# Patient Record
Sex: MALE | Race: WHITE | Employment: FULL TIME | URBAN - METROPOLITAN AREA
[De-identification: names, ages, dates, MRNs, and addresses within clinical notes are randomized per-mention and may not be internally consistent; named-entity substitution may affect disease eponyms.]

---

## 2018-08-31 ENCOUNTER — HOSPITAL ENCOUNTER (OUTPATIENT)
Dept: OTHER | Age: 58
Discharge: HOME | End: 2018-08-31
Attending: INTERNAL MEDICINE | Admitting: INTERNAL MEDICINE

## 2018-08-31 LAB
ALBUMIN SERPL-MCNC: 4.4 G/DL (ref 3.4–5)
ALP BLD-CCNC: 92 U/L (ref 40–129)
ALT SERPL-CCNC: 15 U/L (ref 10–40)
ANION GAP SERPL CALCULATED.3IONS-SCNC: 13 MMOL/L (ref 3–16)
AST SERPL-CCNC: 19 U/L (ref 15–37)
BILIRUB SERPL-MCNC: 0.5 MG/DL (ref 0–1)
BILIRUBIN DIRECT: <0.2 MG/DL (ref 0–0.3)
BILIRUBIN, INDIRECT: NORMAL MG/DL (ref 0–1)
BUN BLDV-MCNC: 15 MG/DL (ref 7–20)
CALCIUM SERPL-MCNC: 9.1 MG/DL (ref 8.3–10.6)
CHLORIDE BLD-SCNC: 105 MMOL/L (ref 99–110)
CHOLESTEROL, TOTAL: 212 MG/DL (ref 0–199)
CO2: 26 MMOL/L (ref 21–32)
CREAT SERPL-MCNC: 1 MG/DL (ref 0.9–1.3)
GFR AFRICAN AMERICAN: >60
GFR NON-AFRICAN AMERICAN: >60
GLUCOSE BLD-MCNC: 90 MG/DL (ref 70–99)
HCT VFR BLD CALC: 43.3 % (ref 40.5–52.5)
HDLC SERPL-MCNC: 59 MG/DL (ref 40–60)
HEMOGLOBIN: 15 G/DL (ref 13.5–17.5)
LDL CHOLESTEROL CALCULATED: 139 MG/DL
MCH RBC QN AUTO: 31.1 PG (ref 26–34)
MCHC RBC AUTO-ENTMCNC: 34.5 G/DL (ref 31–36)
MCV RBC AUTO: 90.1 FL (ref 80–100)
PDW BLD-RTO: 13.1 % (ref 12.4–15.4)
PLATELET # BLD: 156 K/UL (ref 135–450)
PMV BLD AUTO: 9.3 FL (ref 5–10.5)
POTASSIUM SERPL-SCNC: 4.6 MMOL/L (ref 3.5–5.1)
PROSTATE SPECIFIC ANTIGEN: 6.79 NG/ML (ref 0–4)
RBC # BLD: 4.81 M/UL (ref 4.2–5.9)
SODIUM BLD-SCNC: 144 MMOL/L (ref 136–145)
T4 TOTAL: 5.7 UG/DL (ref 4.5–10.9)
TOTAL PROTEIN: 6.8 G/DL (ref 6.4–8.2)
TRIGL SERPL-MCNC: 70 MG/DL (ref 0–150)
TSH SERPL DL<=0.05 MIU/L-ACNC: 3.28 UIU/ML (ref 0.27–4.2)
VLDLC SERPL CALC-MCNC: 14 MG/DL
WBC # BLD: 4.8 K/UL (ref 4–11)

## 2018-10-04 ENCOUNTER — HOSPITAL ENCOUNTER (OUTPATIENT)
Age: 58
Discharge: HOME | End: 2018-10-04
Payer: COMMERCIAL

## 2018-10-04 LAB
BUN BLDV-MCNC: 16 MG/DL (ref 7–20)
CREAT SERPL-MCNC: 1.1 MG/DL (ref 0.9–1.3)
GFR AFRICAN AMERICAN: >60
GFR NON-AFRICAN AMERICAN: >60
PROSTATE SPECIFIC ANTIGEN: 4.63 NG/ML (ref 0–4)

## 2018-10-04 PROCEDURE — 84153 ASSAY OF PSA TOTAL: CPT

## 2018-10-04 PROCEDURE — 82565 ASSAY OF CREATININE: CPT

## 2018-10-04 PROCEDURE — 36415 COLL VENOUS BLD VENIPUNCTURE: CPT

## 2018-10-04 PROCEDURE — 84520 ASSAY OF UREA NITROGEN: CPT

## 2019-08-27 ENCOUNTER — HOSPITAL ENCOUNTER (OUTPATIENT)
Age: 59
Discharge: HOME | End: 2019-08-27
Payer: COMMERCIAL

## 2019-08-27 LAB — PROSTATE SPECIFIC ANTIGEN: 6.86 NG/ML (ref 0–4)

## 2019-08-27 PROCEDURE — 36415 COLL VENOUS BLD VENIPUNCTURE: CPT

## 2019-08-27 PROCEDURE — 84153 ASSAY OF PSA TOTAL: CPT

## 2019-11-01 ENCOUNTER — HOSPITAL ENCOUNTER (OUTPATIENT)
Age: 59
Discharge: HOME | End: 2019-11-01
Payer: COMMERCIAL

## 2019-11-01 LAB
BUN BLDV-MCNC: 17 MG/DL (ref 7–20)
CREAT SERPL-MCNC: 1.1 MG/DL (ref 0.8–1.3)
GFR AFRICAN AMERICAN: >60
GFR NON-AFRICAN AMERICAN: >60

## 2019-11-01 PROCEDURE — 84520 ASSAY OF UREA NITROGEN: CPT

## 2019-11-01 PROCEDURE — 36415 COLL VENOUS BLD VENIPUNCTURE: CPT

## 2019-11-01 PROCEDURE — 82565 ASSAY OF CREATININE: CPT

## 2019-11-20 ENCOUNTER — OFFICE VISIT (OUTPATIENT)
Dept: CARDIOLOGY CLINIC | Age: 59
End: 2019-11-20
Payer: COMMERCIAL

## 2019-11-20 VITALS
SYSTOLIC BLOOD PRESSURE: 129 MMHG | DIASTOLIC BLOOD PRESSURE: 77 MMHG | BODY MASS INDEX: 26.51 KG/M2 | OXYGEN SATURATION: 98 % | WEIGHT: 185.2 LBS | HEART RATE: 75 BPM | HEIGHT: 70 IN

## 2019-11-20 DIAGNOSIS — R53.83 FATIGUE, UNSPECIFIED TYPE: ICD-10-CM

## 2019-11-20 DIAGNOSIS — R40.0 DAYTIME SLEEPINESS: ICD-10-CM

## 2019-11-20 DIAGNOSIS — E78.2 MIXED HYPERLIPIDEMIA: ICD-10-CM

## 2019-11-20 DIAGNOSIS — I34.0 CARDIAC MURMUR DUE TO MITRAL VALVE DISORDER: ICD-10-CM

## 2019-11-20 DIAGNOSIS — R07.89 OTHER CHEST PAIN: ICD-10-CM

## 2019-11-20 DIAGNOSIS — I34.0 MITRAL VALVE INSUFFICIENCY, UNSPECIFIED ETIOLOGY: Primary | ICD-10-CM

## 2019-11-20 DIAGNOSIS — R00.2 PALPITATIONS: ICD-10-CM

## 2019-11-20 PROCEDURE — 93000 ELECTROCARDIOGRAM COMPLETE: CPT | Performed by: INTERNAL MEDICINE

## 2019-11-20 PROCEDURE — 99204 OFFICE O/P NEW MOD 45 MIN: CPT | Performed by: INTERNAL MEDICINE

## 2019-11-20 RX ORDER — CETIRIZINE HYDROCHLORIDE 10 MG/1
10 TABLET ORAL
COMMUNITY

## 2019-12-12 ENCOUNTER — HOSPITAL ENCOUNTER (OUTPATIENT)
Dept: NON INVASIVE DIAGNOSTICS | Age: 59
Discharge: HOME | End: 2019-12-12
Payer: COMMERCIAL

## 2019-12-12 ENCOUNTER — HOSPITAL ENCOUNTER (OUTPATIENT)
Age: 59
Discharge: HOME | End: 2019-12-12
Payer: COMMERCIAL

## 2019-12-12 DIAGNOSIS — R53.83 FATIGUE, UNSPECIFIED TYPE: ICD-10-CM

## 2019-12-12 DIAGNOSIS — I34.0 CARDIAC MURMUR DUE TO MITRAL VALVE DISORDER: ICD-10-CM

## 2019-12-12 DIAGNOSIS — I34.0 MITRAL VALVE INSUFFICIENCY, UNSPECIFIED ETIOLOGY: ICD-10-CM

## 2019-12-12 DIAGNOSIS — R07.89 OTHER CHEST PAIN: ICD-10-CM

## 2019-12-12 DIAGNOSIS — R00.2 PALPITATIONS: ICD-10-CM

## 2019-12-12 DIAGNOSIS — E78.2 MIXED HYPERLIPIDEMIA: ICD-10-CM

## 2019-12-12 LAB
ANION GAP SERPL CALCULATED.3IONS-SCNC: 11 MMOL/L (ref 3–16)
BUN BLDV-MCNC: 16 MG/DL (ref 7–20)
CALCIUM SERPL-MCNC: 9.6 MG/DL (ref 8.3–10.6)
CHLORIDE BLD-SCNC: 103 MMOL/L (ref 99–110)
CHOLESTEROL, TOTAL: 213 MG/DL (ref 0–199)
CO2: 27 MMOL/L (ref 21–32)
CREAT SERPL-MCNC: 1 MG/DL (ref 0.8–1.3)
GFR AFRICAN AMERICAN: >60
GFR NON-AFRICAN AMERICAN: >60
GLUCOSE BLD-MCNC: 90 MG/DL (ref 70–99)
HCT VFR BLD CALC: 44.3 % (ref 40.5–52.5)
HDLC SERPL-MCNC: 67 MG/DL (ref 40–60)
HEMOGLOBIN: 15 G/DL (ref 13.5–17.5)
LDL CHOLESTEROL CALCULATED: 128 MG/DL
LV EF: 50 %
LVEF MODALITY: NORMAL
MCH RBC QN AUTO: 30.5 PG (ref 26–34)
MCHC RBC AUTO-ENTMCNC: 33.8 G/DL (ref 31–36)
MCV RBC AUTO: 90.3 FL (ref 80–100)
PDW BLD-RTO: 13 % (ref 12.4–15.4)
PLATELET # BLD: 163 K/UL (ref 135–450)
PMV BLD AUTO: 9.5 FL (ref 5–10.5)
POTASSIUM SERPL-SCNC: 4.4 MMOL/L (ref 3.5–5.1)
RBC # BLD: 4.9 M/UL (ref 4.2–5.9)
SODIUM BLD-SCNC: 141 MMOL/L (ref 136–145)
TRIGL SERPL-MCNC: 88 MG/DL (ref 0–150)
TSH REFLEX: 3.84 UIU/ML (ref 0.27–4.2)
VLDLC SERPL CALC-MCNC: 18 MG/DL
WBC # BLD: 5.2 K/UL (ref 4–11)

## 2019-12-12 PROCEDURE — 93351 STRESS TTE COMPLETE: CPT

## 2019-12-12 PROCEDURE — 80048 BASIC METABOLIC PNL TOTAL CA: CPT

## 2019-12-12 PROCEDURE — 36415 COLL VENOUS BLD VENIPUNCTURE: CPT

## 2019-12-12 PROCEDURE — 84443 ASSAY THYROID STIM HORMONE: CPT

## 2019-12-12 PROCEDURE — 85027 COMPLETE CBC AUTOMATED: CPT

## 2019-12-12 PROCEDURE — 93320 DOPPLER ECHO COMPLETE: CPT

## 2019-12-12 PROCEDURE — 80061 LIPID PANEL: CPT

## 2020-02-18 ENCOUNTER — OFFICE VISIT (OUTPATIENT)
Dept: CARDIOLOGY CLINIC | Age: 60
End: 2020-02-18
Payer: COMMERCIAL

## 2020-02-18 VITALS
HEART RATE: 69 BPM | HEIGHT: 67 IN | BODY MASS INDEX: 28.91 KG/M2 | SYSTOLIC BLOOD PRESSURE: 118 MMHG | DIASTOLIC BLOOD PRESSURE: 70 MMHG | WEIGHT: 184.2 LBS | OXYGEN SATURATION: 97 %

## 2020-02-18 PROCEDURE — 99214 OFFICE O/P EST MOD 30 MIN: CPT | Performed by: INTERNAL MEDICINE

## 2020-02-18 NOTE — PATIENT INSTRUCTIONS
Call us if palpitations increase in character, frequency, intensity or have associated symptoms such as chest pain, shortness of breath, and dizziness- we can order you a home holter monitor  Recheck cholesterol panel (fasting)  Follow up in December 2020 with an echo the same day

## 2020-02-18 NOTE — LETTER
cetirizine (ZYRTEC) 10 MG tablet Take 10 mg by mouth daily   Yes Historical Provider, MD   Multiple Vitamin (MULTI VITAMIN DAILY PO) Take by mouth   Yes Historical Provider, MD       Allergies:  Pcn [penicillins]     Review of Systems:    [x]Full ROS obtained and negative except as mentioned in HPI    Physical Examination:    /70 (Site: Right Upper Arm, Position: Sitting, Cuff Size: Medium Adult)   Pulse 69   Ht 5' 6.5\" (1.689 m)   Wt 184 lb 3.2 oz (83.6 kg)   SpO2 97%   BMI 29.29 kg/m²    Wt Readings from Last 3 Encounters:   20 184 lb 3.2 oz (83.6 kg)   19 185 lb 3.2 oz (84 kg)   16 178 lb (80.7 kg)        Vitals:    20 1441   BP: 118/70   Pulse: 69   SpO2: 97%       GENERAL: Well developed, well nourished, no acute distress  NEUROLOGICAL: Alert and oriented x3  PSYCH: Normal mood and affect   SKIN: Warm and dry  HEENT: Normocephalic, atraumatic, Sclera non-icteric, mucous membranes moist  NECK: supple, JVP normal  CARDIAC: Normal PMI, regular rate and rhythm, normal S1S2, no murmur, rub, or gallop  RESPIRATORY: Normal respiratory effort, clear to auscultation bilaterally  EXTREMITIES: no edema or clubbing, +2 pulses bilaterally   MUSCULOSKELETAL: No joint swelling or tenderness, no chest wall tenderness  GASTROINTESTINAL: normal bowel sounds, soft, non-tender  ·      Imaging:  I have reviewed the below testing personally:  EK19  NSR  WNL    Echo 12/1/15   Summary   -Normal left ventricle size, wall thickness and systolic function with an   estimated ejection fraction of 55%. -The mitral valve leaflets are slightly thickened with normal leaflet   mobility. Mild mitral regurgitation is present.   -There is mild tricuspid regurgitation with RVSP estimated at 28 mmHg. Stress echo 19   Summary   -Normal left ventricle size, wall thickness, and systolic function with an   estimated ejection fraction of 60%. -No regional wall motion abnormalities are seen. -Mild mitral valve prolapse noted involving both mitral valve leaflets.   -There is moderate eccentric mitral regurgitation noted. -There is mild tricuspid regurgitation with a RVSP estimation of 28 mmHg.   -Normal diastolic function. Avg. E/e'=11.85  Normal stress ECHO. Impression/Recommendations    Mr. Bridget Aldana is a 60 y.o. male patient with:    Palpitations, resolved, declined outpatient monitoring  Moderate mitral regurgitation (Mild mitral valve prolapse involving both leaflets by 12/2019 echo)  Hyperlipidemia, untreated (12/2019:  TG 88 HDL 67 )      Isidoro wanted to attempt lifestyle modifications before starting statin therapy for 10- year ASCVD risk of 8.2% (greater than 7.5%). He is agreeable to recheck of fasting lipids. Blood pressures by home logging have remained well controlled. Lyndon Garrett was pleased with his 11+ minute asymptomatic performance by treadmill and normal stress echo in December 2019. He is agreeable to surveillance echo for moderate primary MR in December of this year. Orders Placed This Encounter   Procedures    LIPID PANEL     Standing Status:   Future     Standing Expiration Date:   2/18/2021     Order Specific Question:   Is Patient Fasting?/# of Hours     Answer:   y8    Echo 2D w doppler w color complete     Standing Status:   Future     Standing Expiration Date:   2/18/2021     Order Specific Question:   Reason for exam:     Answer:   MR     Return in about 10 months (around 12/18/2020). Patient Instructions   Call us if palpitations increase in character, frequency, intensity or have associated symptoms such as chest pain, shortness of breath, and dizziness- we can order you a home holter monitor  Recheck cholesterol panel (fasting)  Follow up in December 2020 with an echo the same day     Thank you for allowing me to participate in the care of your patient. Please do not hesitate to call.      Brian Perez D.O., [address] [facility]

## 2020-02-18 NOTE — PROGRESS NOTES
Financial resource strain: Not on file    Food insecurity:     Worry: Not on file     Inability: Not on file    Transportation needs:     Medical: Not on file     Non-medical: Not on file   Tobacco Use    Smoking status: Never Smoker    Smokeless tobacco: Never Used   Substance and Sexual Activity    Alcohol use: Yes     Comment: 2-7 wkly    Drug use: Not on file    Sexual activity: Not on file   Lifestyle    Physical activity:     Days per week: Not on file     Minutes per session: Not on file    Stress: Not on file   Relationships    Social connections:     Talks on phone: Not on file     Gets together: Not on file     Attends Faith service: Not on file     Active member of club or organization: Not on file     Attends meetings of clubs or organizations: Not on file     Relationship status: Not on file    Intimate partner violence:     Fear of current or ex partner: Not on file     Emotionally abused: Not on file     Physically abused: Not on file     Forced sexual activity: Not on file   Other Topics Concern    Not on file   Social History Narrative    Not on file        Family History:  No evidence for sudden cardiac death or premature CAD. Problem Relation Age of Onset    Hypertension Mother     Uterine Cancer Mother     Thyroid Cancer Sister         breast    Stroke Maternal Grandmother     Cancer Brother         coln       Medications:  [x] Medications and dosages reviewed. Prior to Admission medications    Medication Sig Start Date End Date Taking?  Authorizing Provider   cetirizine (ZYRTEC) 10 MG tablet Take 10 mg by mouth daily   Yes Historical Provider, MD   Multiple Vitamin (MULTI VITAMIN DAILY PO) Take by mouth   Yes Historical Provider, MD       Allergies:  Pcn [penicillins]     Review of Systems:    [x]Full ROS obtained and negative except as mentioned in HPI    Physical Examination:    /70 (Site: Right Upper Arm, Position: Sitting, Cuff Size: Medium Adult)   Pulse 69 Ht 5' 6.5\" (1.689 m)   Wt 184 lb 3.2 oz (83.6 kg)   SpO2 97%   BMI 29.29 kg/m²   Wt Readings from Last 3 Encounters:   20 184 lb 3.2 oz (83.6 kg)   19 185 lb 3.2 oz (84 kg)   16 178 lb (80.7 kg)        Vitals:    20 1441   BP: 118/70   Pulse: 69   SpO2: 97%       GENERAL: Well developed, well nourished, no acute distress  NEUROLOGICAL: Alert and oriented x3  PSYCH: Normal mood and affect   SKIN: Warm and dry  HEENT: Normocephalic, atraumatic, Sclera non-icteric, mucous membranes moist  NECK: supple, JVP normal  CARDIAC: Normal PMI, regular rate and rhythm, normal S1S2, no murmur, rub, or gallop  RESPIRATORY: Normal respiratory effort, clear to auscultation bilaterally  EXTREMITIES: no edema or clubbing, +2 pulses bilaterally   MUSCULOSKELETAL: No joint swelling or tenderness, no chest wall tenderness  GASTROINTESTINAL: normal bowel sounds, soft, non-tender  ·      Imaging:  I have reviewed the below testing personally:  EK19  NSR  WNL    Echo 12/1/15   Summary   -Normal left ventricle size, wall thickness and systolic function with an   estimated ejection fraction of 55%. -The mitral valve leaflets are slightly thickened with normal leaflet   mobility. Mild mitral regurgitation is present.   -There is mild tricuspid regurgitation with RVSP estimated at 28 mmHg. Stress echo 19   Summary   -Normal left ventricle size, wall thickness, and systolic function with an   estimated ejection fraction of 60%. -No regional wall motion abnormalities are seen. -Mild mitral valve prolapse noted involving both mitral valve leaflets.   -There is moderate eccentric mitral regurgitation noted. -There is mild tricuspid regurgitation with a RVSP estimation of 28 mmHg.   -Normal diastolic function. Avg. E/e'=11.85  Normal stress ECHO. Impression/Recommendations    Mr. Willie Deutsch is a 60 y.o. male patient with:    Palpitations, resolved, declined outpatient monitoring  Moderate mitral regurgitation (Mild mitral valve prolapse involving both leaflets by 12/2019 echo)  Hyperlipidemia, untreated (12/2019:  TG 88 HDL 67 )      Isidoro wanted to attempt lifestyle modifications before starting statin therapy for 10- year ASCVD risk of 8.2% (greater than 7.5%). He is agreeable to recheck of fasting lipids. Blood pressures by home logging have remained well controlled. Jesus Norman was pleased with his 11+ minute asymptomatic performance by treadmill and normal stress echo in December 2019. He is agreeable to surveillance echo for moderate primary MR in December of this year. Orders Placed This Encounter   Procedures    LIPID PANEL     Standing Status:   Future     Standing Expiration Date:   2/18/2021     Order Specific Question:   Is Patient Fasting?/# of Hours     Answer:   y8    Echo 2D w doppler w color complete     Standing Status:   Future     Standing Expiration Date:   2/18/2021     Order Specific Question:   Reason for exam:     Answer:   MR     Return in about 10 months (around 12/18/2020). Patient Instructions   Call us if palpitations increase in character, frequency, intensity or have associated symptoms such as chest pain, shortness of breath, and dizziness- we can order you a home holter monitor  Recheck cholesterol panel (fasting)  Follow up in December 2020 with an echo the same day     Thank you for allowing me to participate in the care of your patient. Please do not hesitate to call. Dahlia Duarte D.O., [facility]  Interventional Cardiology     o: [phone]  [address] [place] [address]., [address], [address] [facility]    NOTE:  This report was transcribed using voice recognition software. Every effort was made to ensure accuracy; however, inadvertent computerized transcription errors may be present. Scribe's Attestation:  This note was scribed in the presence of Dr. Yi Mattson DO by Yane Murray, RAMOS.    IDahlia, have personally

## 2020-03-18 ENCOUNTER — OFFICE VISIT (OUTPATIENT)
Dept: PULMONOLOGY | Age: 60
End: 2020-03-18
Payer: COMMERCIAL

## 2020-03-18 VITALS
HEART RATE: 61 BPM | SYSTOLIC BLOOD PRESSURE: 118 MMHG | DIASTOLIC BLOOD PRESSURE: 78 MMHG | WEIGHT: 181 LBS | HEIGHT: 70 IN | BODY MASS INDEX: 25.91 KG/M2 | OXYGEN SATURATION: 99 %

## 2020-03-18 PROCEDURE — 99244 OFF/OP CNSLTJ NEW/EST MOD 40: CPT | Performed by: INTERNAL MEDICINE

## 2020-03-18 ASSESSMENT — SLEEP AND FATIGUE QUESTIONNAIRES
HOW LIKELY ARE YOU TO NOD OFF OR FALL ASLEEP WHEN YOU ARE A PASSENGER IN A CAR FOR AN HOUR WITHOUT A BREAK: 3
HOW LIKELY ARE YOU TO NOD OFF OR FALL ASLEEP IN A CAR, WHILE STOPPED FOR A FEW MINUTES IN TRAFFIC: 0
ESS TOTAL SCORE: 16
HOW LIKELY ARE YOU TO NOD OFF OR FALL ASLEEP WHILE SITTING QUIETLY AFTER LUNCH WITHOUT ALCOHOL: 2
HOW LIKELY ARE YOU TO NOD OFF OR FALL ASLEEP WHILE WATCHING TV: 3
HOW LIKELY ARE YOU TO NOD OFF OR FALL ASLEEP WHILE SITTING AND TALKING TO SOMEONE: 0
NECK CIRCUMFERENCE (INCHES): 16
HOW LIKELY ARE YOU TO NOD OFF OR FALL ASLEEP WHILE LYING DOWN TO REST IN THE AFTERNOON WHEN CIRCUMSTANCES PERMIT: 3
HOW LIKELY ARE YOU TO NOD OFF OR FALL ASLEEP WHILE SITTING AND READING: 3
HOW LIKELY ARE YOU TO NOD OFF OR FALL ASLEEP WHILE SITTING INACTIVE IN A PUBLIC PLACE: 2

## 2020-03-18 ASSESSMENT — ENCOUNTER SYMPTOMS
CHOKING: 0
SHORTNESS OF BREATH: 0
VOMITING: 0
EYE PAIN: 0
ALLERGIC/IMMUNOLOGIC NEGATIVE: 1
APNEA: 0
ABDOMINAL PAIN: 0
NAUSEA: 0
CHEST TIGHTNESS: 0
ABDOMINAL DISTENTION: 0
RHINORRHEA: 0
PHOTOPHOBIA: 0

## 2020-03-18 NOTE — PROGRESS NOTES
Yvette Paulino MD, FAASM, [facility]  [facility] HEART AND SURGICAL [address]  3rd Floor,  [address] [facility], [facility] Anahi Salas E [phone]   [facility] [address] Siddhartha Bains. [address] [facility]. Fl[address] [phone]     [place] [address]  Dept: [phone]  Loc: [phone]    Assessment:      Visit Diagnoses and Associated Orders     Hypersomnia   (New Problem)  -  Primary    needs work-up    Home Sleep Study (HST) [73162 Custom]   - Future Order         Snoring   (New Problem)      needs work-up    Home Sleep Study (HST) [37415 Custom]   - Future Order         Allergic rhinitis, unspecified seasonality, unspecified trigger   (Stable)                  Plan:      Differential diagnosis includes but not limited to: CASE, PLMD's, narcolepsy, parasomnias. Reviewed CASE (highest likelihood Dx): pathophysiology, diagnosis, complications and treatment. Instructed him not to drive if drowsy. Continue medications per her PCP and other physicians. Limit caffeine use after 3pm. Standard of care is to do in-lab PSG but insurance is mandating an inferior HST. 1 wk follow up after study to review his results. The chronic medical conditions listed are directly related to the primary diagnosis listed above. The management of the primary diagnosis affects the secondary diagnosis and vice versa. Continue meds for: allergic rhinitis. Orders Placed This Encounter   Procedures    Home Sleep Study (HST)          Subjective:     Patient ID: Steven Alcazar is a 60 y.o. male. Chief Complaint   Patient presents with    Snoring    Daytime Sleepiness       HPI:      Steven Alcazar is a 60 y.o. male referred by Loree Vinson DO for a sleep evaluation. He complains of: snoring, excessive daytime sleepiness , non-restorative sleep, napping and tossing and turning at night. He denies: cataplexy and hypnagogic hallucinations. Symptoms began 3 years ago, gradually worsening since that time. allergic rhinitis: stable on meds and followed by pt's PCP and other physicians. Previous evaluation and treatment has included- none    DOT/CDL - No  FAA/'s license -No    Previous Report(s) Reviewed: historical medical records, office notes, andreferral letter(s). Pertinent data has been documented.     Wilmar - Total score: 16    Caffeine Intake - Coffee: 2-3 cup(s) per day    Social History     Socioeconomic History    Marital status:      Spouse name: Not on file    Number of children: Not on file    Years of education: Not on file    Highest education level: Not on file   Occupational History    Not on file   Social Needs    Financial resource strain: Not on file    Food insecurity     Worry: Not on file     Inability: Not on file    Transportation needs     Medical: Not on file     Non-medical: Not on file   Tobacco Use    Smoking status: Never Smoker    Smokeless tobacco: Never Used   Substance and Sexual Activity    Alcohol use: Yes     Comment: 2-7 wkly    Drug use: Not on file    Sexual activity: Not on file   Lifestyle    Physical activity     Days per week: Not on file     Minutes per session: Not on file    Stress: Not on file   Relationships    Social connections     Talks on phone: Not on file     Gets together: Not on file     Attends Sikh service: Not on file     Active member of club or organization: Not on file     Attends meetings of clubs or organizations: Not on file     Relationship status: Not on file    Intimate partner violence     Fear of current or ex partner: Not on file     Emotionally abused: Not on file     Physically abused: Not on file     Forced sexual activity: Not on file   Other Topics Concern    Not on file   Social History Narrative    Not on file        Current Outpatient Medications   Medication Sig Dispense Refill    cetirizine (ZYRTEC) 10 MG tablet Take 10 mg by Wt Readings from Last 3 Encounters:   03/18/20 181 lb (82.1 kg)   02/18/20 184 lb 3.2 oz (83.6 kg)   11/20/19 185 lb 3.2 oz (84 kg)    Body mass index is 25.97 kg/m². BP HR SaO2   BP Readings from Last 3 Encounters:   03/18/20 118/78   02/18/20 118/70   11/20/19 129/77    Pulse Readings from Last 3 Encounters:   03/18/20 61   02/18/20 69   11/20/19 75    SpO2 Readings from Last 3 Encounters:   03/18/20 99%   02/18/20 97%   11/20/19 98%        Physical Exam  Vitals signs reviewed. Constitutional:       General: He is not in acute distress. Appearance: Normal appearance. He is well-developed. He is not toxic-appearing or diaphoretic. HENT:      Head: Normocephalic and atraumatic. Not macrocephalic and not microcephalic. Right Ear: External ear normal.      Left Ear: External ear normal.      Nose: Nasal deformity, septal deviation and mucosal edema present. Mouth/Throat:      Lips: Pink. Mouth: Mucous membranes are moist.      Tongue: No lesions. Palate: No mass. Pharynx: Uvula midline. No oropharyngeal exudate or uvula swelling. Tonsils: No tonsillar exudate or tonsillar abscesses. Comments: Tonsils: absent bilaterally  Eyes:      General: Lids are normal.      Conjunctiva/sclera: Conjunctivae normal.      Pupils: Pupils are equal, round, and reactive to light. Neck:      Thyroid: No thyroid mass or thyromegaly. Vascular: No JVD. Trachea: Trachea normal.      Comments: Neck Circ: 16 inches    Cardiovascular:      Rate and Rhythm: Normal rate and regular rhythm. Heart sounds: Normal heart sounds, S1 normal and S2 normal.   Pulmonary:      Effort: Pulmonary effort is normal. No respiratory distress. Breath sounds: Normal breath sounds. No decreased breath sounds, wheezing, rhonchi or rales. Abdominal:      General: Bowel sounds are normal.      Palpations: Abdomen is soft. Tenderness: There is no abdominal tenderness.    Musculoskeletal:

## 2020-05-06 ENCOUNTER — HOSPITAL ENCOUNTER (OUTPATIENT)
Age: 60
Discharge: HOME | End: 2020-05-06
Payer: COMMERCIAL

## 2020-05-06 LAB
BUN BLDV-MCNC: 15 MG/DL (ref 7–20)
CHOLESTEROL, TOTAL: 216 MG/DL (ref 0–199)
CREAT SERPL-MCNC: 1 MG/DL (ref 0.8–1.3)
GFR AFRICAN AMERICAN: >60
GFR NON-AFRICAN AMERICAN: >60
HDLC SERPL-MCNC: 63 MG/DL (ref 40–60)
LDL CHOLESTEROL CALCULATED: 137 MG/DL
PROSTATE SPECIFIC ANTIGEN: 6.89 NG/ML (ref 0–4)
TRIGL SERPL-MCNC: 79 MG/DL (ref 0–150)
VLDLC SERPL CALC-MCNC: 16 MG/DL

## 2020-05-06 PROCEDURE — 82565 ASSAY OF CREATININE: CPT

## 2020-05-06 PROCEDURE — 80061 LIPID PANEL: CPT

## 2020-05-06 PROCEDURE — 84153 ASSAY OF PSA TOTAL: CPT

## 2020-05-06 PROCEDURE — 84520 ASSAY OF UREA NITROGEN: CPT

## 2020-05-06 PROCEDURE — 36415 COLL VENOUS BLD VENIPUNCTURE: CPT

## 2020-05-08 NOTE — RESULT ENCOUNTER NOTE
Spoke with Suraj Dickson regarding LDL of 137. Per [facility] recommend starting Lipitor 20 mg and recheck lipids/ast/alt in 2-3 mo due to ASCVD risk of 8.2%. He would like to speak with his wife first. Also informed him we could complete CT Calcium Score to quantify plaque in heart arteries. He verbalized understanding, states he will call Monday with his decision.

## 2020-05-15 ENCOUNTER — TELEPHONE (OUTPATIENT)
Dept: CARDIOLOGY CLINIC | Age: 60
End: 2020-05-15

## 2020-05-15 ENCOUNTER — TELEPHONE (OUTPATIENT)
Dept: SLEEP CENTER | Age: 60
End: 2020-05-15

## 2020-05-15 RX ORDER — ATORVASTATIN CALCIUM 20 MG/1
20 TABLET, FILM COATED ORAL NIGHTLY
Qty: 90 TABLET | Refills: 3 | Status: SHIPPED | OUTPATIENT
Start: 2020-05-15 | End: 2022-08-23

## 2020-05-19 ENCOUNTER — HOSPITAL ENCOUNTER (OUTPATIENT)
Dept: SLEEP CENTER | Age: 60
Discharge: HOME | End: 2020-05-19
Payer: COMMERCIAL

## 2020-05-19 PROCEDURE — 95806 SLEEP STUDY UNATT&RESP EFFT: CPT | Performed by: INTERNAL MEDICINE

## 2020-05-19 PROCEDURE — 95806 SLEEP STUDY UNATT&RESP EFFT: CPT

## 2020-06-02 ENCOUNTER — TELEPHONE (OUTPATIENT)
Dept: PULMONOLOGY | Age: 60
End: 2020-06-02

## 2020-06-02 NOTE — TELEPHONE ENCOUNTER
Reviewed results of HST with pt. Pt states he will talk with his spouse and call back if he decides to treat his sleep apnea.

## 2020-11-30 ENCOUNTER — TELEPHONE (OUTPATIENT)
Dept: CARDIOLOGY CLINIC | Age: 60
End: 2020-11-30

## 2020-11-30 ENCOUNTER — HOSPITAL ENCOUNTER (OUTPATIENT)
Age: 60
Discharge: HOME | End: 2020-11-30
Payer: COMMERCIAL

## 2020-11-30 LAB
ALT SERPL-CCNC: 16 U/L (ref 10–40)
AST SERPL-CCNC: 19 U/L (ref 15–37)
CHOLESTEROL, TOTAL: 221 MG/DL (ref 0–199)
HDLC SERPL-MCNC: 64 MG/DL (ref 40–60)
LDL CHOLESTEROL CALCULATED: 143 MG/DL
TRIGL SERPL-MCNC: 71 MG/DL (ref 0–150)
VLDLC SERPL CALC-MCNC: 14 MG/DL

## 2020-11-30 PROCEDURE — 84460 ALANINE AMINO (ALT) (SGPT): CPT

## 2020-11-30 PROCEDURE — 84450 TRANSFERASE (AST) (SGOT): CPT

## 2020-11-30 PROCEDURE — 36415 COLL VENOUS BLD VENIPUNCTURE: CPT

## 2020-11-30 PROCEDURE — 80061 LIPID PANEL: CPT

## 2020-11-30 NOTE — TELEPHONE ENCOUNTER
Pt called back to answer coivd questions prior to 12/1/20 appt. no covid symptoms no testing. 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 everyone wearing mask and kept 6 ft appt. Can he keep appt. Please call to advise.

## 2020-11-30 NOTE — TELEPHONE ENCOUNTER
Unfortunately, appointment and echo should both be rescheduled. I can do 12/15 (just cancelled one) if they can get his echo as well.

## 2020-12-01 NOTE — RESULT ENCOUNTER NOTE
BARBARAN reviewed lipids. We discussed elevated LDL result. LIMA would like Charley Yazanraffi to complete CT calcium score prior to ov 12/18 regarding lipid control recommendations. He would like to speak with his wife first and let us know. Otherwise he is scheduled for 12/18 with BARBARAN and echo the same day.

## 2020-12-18 ENCOUNTER — OFFICE VISIT (OUTPATIENT)
Dept: CARDIOLOGY CLINIC | Age: 60
End: 2020-12-18
Payer: COMMERCIAL

## 2020-12-18 ENCOUNTER — HOSPITAL ENCOUNTER (OUTPATIENT)
Dept: NON INVASIVE DIAGNOSTICS | Age: 60
Discharge: HOME | End: 2020-12-18
Payer: COMMERCIAL

## 2020-12-18 VITALS
OXYGEN SATURATION: 98 % | HEART RATE: 64 BPM | SYSTOLIC BLOOD PRESSURE: 137 MMHG | WEIGHT: 183.6 LBS | BODY MASS INDEX: 26.28 KG/M2 | HEIGHT: 70 IN | DIASTOLIC BLOOD PRESSURE: 81 MMHG

## 2020-12-18 LAB
LV EF: 60 %
LVEF MODALITY: NORMAL

## 2020-12-18 PROCEDURE — 93306 TTE W/DOPPLER COMPLETE: CPT

## 2020-12-18 PROCEDURE — 99214 OFFICE O/P EST MOD 30 MIN: CPT | Performed by: INTERNAL MEDICINE

## 2020-12-18 NOTE — PROGRESS NOTES
20    PATIENT: Jayce Joel  : 1960    Primary Care Provider:   Gina Capellan MD  L:[phone]  W:[phone]    Reason for evaluation:   Chief Complaint   Patient presents with    6 Month Follow-Up       History of present illness:   Mr. Jayce Joel is a 63 y.o. male patient here in routine follow-up for hyperlipidemia and mitral regurgitation, with echocardiogram today. Jose Gamez did not start the statin after last visit for intermediate ASCVD risk score indication. He states that he is hoping to not take medications as he historically has not needed prescribed medications. He reports normal blood pressures and also states that he continues to lead his lifestyle without symptoms or concerns since initial incident last year that prompted workup. Denies palpitations heartburn dizziness chest pain shortness of breath. Jose Gamez is a  and his wife is a retired RN from [employer].       Medical History:      Diagnosis Date    Cancer [facility]) 2019    Prostate    Hyperlipidemia        Surgical History:      Procedure Laterality Date    HERNIA REPAIR   and     HOME SLEEP STUDY  2020         TONSILLECTOMY         Social History:  Social History     Socioeconomic History    Marital status:      Spouse name: Not on file    Number of children: Not on file    Years of education: Not on file    Highest education level: Not on file   Occupational History    Not on file   Social Needs    Financial resource strain: Not on file    Food insecurity     Worry: Not on file     Inability: Not on file   [employer] needs     Medical: Not on file     Non-medical: Not on file   Tobacco Use    Smoking status: Never Smoker    Smokeless tobacco: Never Used   Substance and Sexual Activity    Alcohol use: Yes     Comment: 2-7 wkly    Drug use: Not on file    Sexual activity: Not on file   Lifestyle    Physical activity     Days per week: Not on file     Minutes per session: Not on file    Stress: Not on file   Relationships    Social connections     Talks on phone: Not on file     Gets together: Not on file     Attends Sabianism service: Not on file     Active member of club or organization: Not on file     Attends meetings of clubs or organizations: Not on file     Relationship status: Not on file    Intimate partner violence     Fear of current or ex partner: Not on file     Emotionally abused: Not on file     Physically abused: Not on file     Forced sexual activity: Not on file   Other Topics Concern    Not on file   Social History Narrative    Not on file        Family History:  No evidence for sudden cardiac death or premature CAD. Problem Relation Age of Onset    Hypertension Mother     Uterine Cancer Mother     Thyroid Cancer Sister         breast    Stroke Maternal Grandmother     Cancer Brother         coln       Medications:  [x] Medications and dosages reviewed. Prior to Admission medications    Medication Sig Start Date End Date Taking?  Authorizing Provider   cetirizine (ZYRTEC) 10 MG tablet Take 10 mg by mouth three times a week    Yes Historical Provider, MD   Multiple Vitamin (MULTI VITAMIN DAILY PO) Take by mouth   Yes Historical Provider, MD   atorvastatin (LIPITOR) 20 MG tablet Take 1 tablet by mouth nightly  Patient not taking: Reported on 12/18/2020 5/15/20   Rekha Nunez DO       Allergies:  Pcn [penicillins]     Review of Systems:    [x]Full ROS obtained and negative except as mentioned in HPI    Physical Examination:    /81   Pulse 64   Ht 5' 10\" (1.778 m)   Wt 183 lb 9.6 oz (83.3 kg)   SpO2 98%   BMI 26.34 kg/m²   Wt Readings from Last 3 Encounters:   12/18/20 183 lb 9.6 oz (83.3 kg)   03/18/20 181 lb (82.1 kg)   02/18/20 184 lb 3.2 oz (83.6 kg)     Vitals:    12/18/20 1145   BP: 137/81   Pulse: 64   SpO2: 98%       · GENERAL: Well developed, well nourished, no acute distress  · NEUROLOGICAL: Alert and oriented x3  · PSYCH: Normal mood and affect   · SKIN: Warm and dry  · HEENT: Normocephalic, atraumatic, Sclera non-icteric, mucous membranes moist  · NECK: supple, JVP normal  · CARDIAC: Normal PMI, regular rate and rhythm, normal S1S2, no murmur, rub, or gallop  · RESPIRATORY: Normal respiratory effort, clear to auscultation bilaterally  · EXTREMITIES: no edema or clubbing, +2 pulses bilaterally   · MUSCULOSKELETAL: No joint swelling or tenderness, no chest wall tenderness  · GASTROINTESTINAL: normal bowel sounds, soft, non-tender      Labs:  Lab Review   Hospital Outpatient Visit on 11/30/2020   Component Date Value    Cholesterol, Total 11/30/2020 221*    Triglycerides 11/30/2020 71     HDL 11/30/2020 64*    LDL Calculated 11/30/2020 143*    VLDL Cholesterol Calcula* 11/30/2020 14     ALT 11/30/2020 16     AST 11/30/2020 19        Imaging:  I have reviewed the below testing personally:    Stress echo   12/12/19   Summary   -Normal left ventricle size, wall thickness, and systolic function with an   estimated ejection fraction of 60%.  -No regional wall motion abnormalities are seen.   -Mild mitral valve prolapse noted involving both mitral valve leaflets.   -There is moderate eccentric mitral regurgitation noted.   -There is mild tricuspid regurgitation with a RVSP estimation of 28 mmHg.   -Normal diastolic function. Avg. E/e'=11.85  Normal stress ECHO. 12/18/20   Summary   Normal left ventricle size, wall thickness and systolic function with an   estimated ejection fraction of 60%. No regional wall motion abnormalities are seen. Normal diastolic function. Mitral valve leaflets appear thickened and myxomatous. There is prolapse of   the anterior and posterior mitral valve leaflets resulting in moderate   mitral regurgitation. The left atrium is mildly dilated. Mild tricuspid regurgitation with a PASP of 20 mmHg. Impression/Recommendations    Mr. Meche Anthonys Emily Jean-Baptiste is a 63 y.o. male patient with:    Moderate, primary mitral regurgitation (prolapse involving both leaflets); normal LV size and function. Discussed need for continued monitoring as well as eventual JOHNNA. TTE in 6-12 months. Palpitations, resolved, declined outpatient monitoring  Hyperlipidemia, untreated (12/2019:  TG 88 HDL 67 ), declined statin after last visit for intermediate ASCVD risk score indication. He is reluctant to start any medications. He is agreeable to CT coronary calcium scoring. Arvin Craig was pleased with his 11+ minute asymptomatic performance by treadmill and normal stress echo in December 2019. )The 10-year ASCVD risk score (Samira Mak., et al., 2013) is: 9.6%    Values used to calculate the score:      Age: 63 years      Sex: Male      Is Non- : No      Diabetic: No      Tobacco smoker: No      Systolic Blood Pressure: 326 mmHg      Is BP treated: No      HDL Cholesterol: 64 mg/dL      Total Cholesterol: 221 mg/dL      Orders Placed This Encounter   Procedures    CT CARDIAC CALCIUM SCORING     Standing Status:   Future     Standing Expiration Date:   12/18/2021     Order Specific Question:   Reason for exam:     Answer:   hld     Return for Testing. Patient Instructions   Recommend CT Calcium Score to quantify whether there is hard plaque in your heart arteries  You may call [address] [place] [address] to schedule    This is recommended based off your elevated cholesterol results  Follow up in 1 year    Call us if you want an event monitor for your palpitations     Thank you for allowing me to participate in the care of your patient. Please do not hesitate to call. Anni Aponte DO, [facility]  Interventional Cardiology     o: [phone]  [address] [place] [address], [address] [facility], [address]      NOTE:  This report was transcribed using voice recognition software.   Every effort was made to ensure accuracy; however, inadvertent computerized transcription errors may be present. The above documentation has been prepared under my direction and personally reviewed by me in its entirety. I confirm that the note above accurately reflects all work, treatment, procedures, and medical decision making performed by me. An electronic signature was used to authenticate this note.

## 2020-12-18 NOTE — PATIENT INSTRUCTIONS
Recommend CT Calcium Score to quantify whether there is hard plaque in your heart arteries  You may call [address] [place] [address] to schedule    This is recommended based off your elevated cholesterol results  Follow up in 1 year    Call us if you want an event monitor for your palpitations

## 2020-12-18 NOTE — LETTER
[address] [address] [address] [address]  Phone: [phone]  Fax: [facility]        2021     Shaun Sanford [address] [place] [id number]    Patient: Tomasa Michele  MR Number: [id number]  YOB: 1960  Date of Visit: 2020    Dear Dr. Osiris Vanessa:                                         20    PATIENT: Tomasa Michele  : 1960    Primary Care Provider:   Osiris Vanessa MD  D:[phone]  U:[phone]    Reason for evaluation:   Chief Complaint   Patient presents with    6 Month Follow-Up       History of present illness:   Mr. Tomasa Michele is a 63 y.o. male patient here in routine follow-up for hyperlipidemia and mitral regurgitation, with echocardiogram today. Chelsea Wong did not start the statin after last visit for intermediate ASCVD risk score indication. He states that he is hoping to not take medications as he historically has not needed prescribed medications. He reports normal blood pressures and also states that he continues to lead his lifestyle without symptoms or concerns since initial incident last year that prompted workup. Denies palpitations heartburn dizziness chest pain shortness of breath. Chelsea Wong is a  and his wife is a retired RN from [employer].       Medical History:      Diagnosis Date    Cancer [facility]) 2019    Prostate    Hyperlipidemia        Surgical History:      Procedure Laterality Date    HERNIA REPAIR   and    [address] [place] [address]  2020         TONSILLECTOMY         Social History:  Social History     Socioeconomic History    Marital status:      Spouse name: Not on file    Number of children: Not on file    Years of education: Not on file    Highest education level: Not on file   Occupational History    Not on file   Social Needs    Financial resource strain: Not on file    Food insecurity Worry: Not on file     Inability: Not on file    Transportation needs     Medical: Not on file     Non-medical: Not on file   Tobacco Use    Smoking status: Never Smoker    Smokeless tobacco: Never Used   Substance and Sexual Activity    Alcohol use: Yes     Comment: 2-7 wkly    Drug use: Not on file    Sexual activity: Not on file   Lifestyle    Physical activity     Days per week: Not on file     Minutes per session: Not on file    Stress: Not on file   Relationships    Social connections     Talks on phone: Not on file     Gets together: Not on file     Attends Jainism service: Not on file     Active member of club or organization: Not on file     Attends meetings of clubs or organizations: Not on file     Relationship status: Not on file    Intimate partner violence     Fear of current or ex partner: Not on file     Emotionally abused: Not on file     Physically abused: Not on file     Forced sexual activity: Not on file   Other Topics Concern    Not on file   Social History Narrative    Not on file        Family History:  No evidence for sudden cardiac death or premature CAD. Problem Relation Age of Onset    Hypertension Mother     Uterine Cancer Mother     Thyroid Cancer Sister         breast    Stroke Maternal Grandmother     Cancer Brother         coln       Medications:  [x] Medications and dosages reviewed. Prior to Admission medications    Medication Sig Start Date End Date Taking?  Authorizing Provider   cetirizine (ZYRTEC) 10 MG tablet Take 10 mg by mouth three times a week    Yes Historical Provider, MD   Multiple Vitamin (MULTI VITAMIN DAILY PO) Take by mouth   Yes Historical Provider, MD   atorvastatin (LIPITOR) 20 MG tablet Take 1 tablet by mouth nightly  Patient not taking: Reported on 12/18/2020 5/15/20   Wadie Miners, DO       Allergies:  Pcn [penicillins]     Review of Systems:    [x]Full ROS obtained and negative except as mentioned in HPI Physical Examination:    /81   Pulse 64   Ht 5' 10\" (1.778 m)   Wt 183 lb 9.6 oz (83.3 kg)   SpO2 98%   BMI 26.34 kg/m²   Wt Readings from Last 3 Encounters:   12/18/20 183 lb 9.6 oz (83.3 kg)   03/18/20 181 lb (82.1 kg)   02/18/20 184 lb 3.2 oz (83.6 kg)     Vitals:    12/18/20 1145   BP: 137/81   Pulse: 64   SpO2: 98%       · GENERAL: Well developed, well nourished, no acute distress  · NEUROLOGICAL: Alert and oriented x3  · PSYCH: Normal mood and affect   · SKIN: Warm and dry  · HEENT: Normocephalic, atraumatic, Sclera non-icteric, mucous membranes moist  · NECK: supple, JVP normal  · CARDIAC: Normal PMI, regular rate and rhythm, normal S1S2, no murmur, rub, or gallop  · RESPIRATORY: Normal respiratory effort, clear to auscultation bilaterally  · EXTREMITIES: no edema or clubbing, +2 pulses bilaterally   · MUSCULOSKELETAL: No joint swelling or tenderness, no chest wall tenderness  · GASTROINTESTINAL: normal bowel sounds, soft, non-tender      Labs:  Lab Review   Hospital Outpatient Visit on 11/30/2020   Component Date Value    Cholesterol, Total 11/30/2020 221*    Triglycerides 11/30/2020 71     HDL 11/30/2020 64*    LDL Calculated 11/30/2020 143*    VLDL Cholesterol Calcula* 11/30/2020 14     ALT 11/30/2020 16     AST 11/30/2020 19        Imaging:  I have reviewed the below testing personally:    Stress echo   12/12/19   Summary   -Normal left ventricle size, wall thickness, and systolic function with an   estimated ejection fraction of 60%.  -No regional wall motion abnormalities are seen.   -Mild mitral valve prolapse noted involving both mitral valve leaflets.   -There is moderate eccentric mitral regurgitation noted.   -There is mild tricuspid regurgitation with a RVSP estimation of 28 mmHg.   -Normal diastolic function. Avg. E/e'=11.85  Normal stress ECHO.     12/18/20   Summary   Normal left ventricle size, wall thickness and systolic function with an estimated ejection fraction of 60%. No regional wall motion abnormalities are seen. Normal diastolic function. Mitral valve leaflets appear thickened and myxomatous. There is prolapse of   the anterior and posterior mitral valve leaflets resulting in moderate   mitral regurgitation. The left atrium is mildly dilated. Mild tricuspid regurgitation with a PASP of 20 mmHg. Impression/Recommendations    Mr. Debbi Back is a 63 y.o. male patient with:    Moderate, primary mitral regurgitation (prolapse involving both leaflets); normal LV size and function. Discussed need for continued monitoring as well as eventual JOHNNA. TTE in 6-12 months. Palpitations, resolved, declined outpatient monitoring  Hyperlipidemia, untreated (12/2019:  TG 88 HDL 67 ), declined statin after last visit for intermediate ASCVD risk score indication. He is reluctant to start any medications. He is agreeable to CT coronary calcium scoring. Sebastián Pato was pleased with his 11+ minute asymptomatic performance by treadmill and normal stress echo in December 2019. )The 10-year ASCVD risk score (López Saldana., et al., 2013) is: 9.6%    Values used to calculate the score:      Age: 63 years      Sex: Male      Is Non- : No      Diabetic: No      Tobacco smoker: No      Systolic Blood Pressure: 177 mmHg      Is BP treated: No      HDL Cholesterol: 64 mg/dL      Total Cholesterol: 221 mg/dL      Orders Placed This Encounter   Procedures    CT CARDIAC CALCIUM SCORING     Standing Status:   Future     Standing Expiration Date:   12/18/2021     Order Specific Question:   Reason for exam:     Answer:   hld     Return for Testing.   Patient Instructions   Recommend CT Calcium Score to quantify whether there is hard plaque in your heart arteries  You may call [address] [place] [address] to schedule    This is recommended based off your elevated cholesterol results  Follow up in 1 year Call us if you want an event monitor for your palpitations     Thank you for allowing me to participate in the care of your patient. Please do not hesitate to call. Ctarachito Rock DO, [facility]  Interventional Cardiology     o: [phone]  [address] [place], [place] [address]., [address], [address]      NOTE:  This report was transcribed using voice recognition software. Every effort was made to ensure accuracy; however, inadvertent computerized transcription errors may be present. The above documentation has been prepared under my direction and personally reviewed by me in its entirety. I confirm that the note above accurately reflects all work, treatment, procedures, and medical decision making performed by me. An electronic signature was used to authenticate this note.        Sincerely,        Brian Atkins DO

## 2021-01-28 ENCOUNTER — HOSPITAL ENCOUNTER (OUTPATIENT)
Dept: CT IMAGING | Age: 61
Discharge: HOME | End: 2021-01-28
Payer: COMMERCIAL

## 2021-01-28 DIAGNOSIS — E78.2 MIXED HYPERLIPIDEMIA: ICD-10-CM

## 2021-01-28 PROCEDURE — 75571 CT HRT W/O DYE W/CA TEST: CPT

## 2021-02-04 ENCOUNTER — TELEPHONE (OUTPATIENT)
Dept: CARDIOLOGY CLINIC | Age: 61
End: 2021-02-04

## 2021-02-04 NOTE — TELEPHONE ENCOUNTER
Left message for patient to call back, need to inform Barbara Ortiz is not in the office today, need to know if there is anything we can do for him or a message we can send back.

## 2021-02-05 ENCOUNTER — TELEPHONE (OUTPATIENT)
Dept: CARDIOLOGY CLINIC | Age: 61
End: 2021-02-05

## 2021-02-05 NOTE — TELEPHONE ENCOUNTER
Called Nicol Hans back, insurance did not cover calcium score- just wanted to discuss potential out of pocket cost. Answered all questions. He will let us know if there are any other concerns.

## 2021-02-05 NOTE — TELEPHONE ENCOUNTER
Pt calling back again asking to speak with Iveth Carlos A if possible call him before noon today due to his work schedule thank you

## 2021-02-19 ENCOUNTER — HOSPITAL ENCOUNTER (OUTPATIENT)
Age: 61
Discharge: HOME | End: 2021-02-19
Payer: COMMERCIAL

## 2021-02-19 LAB — PROSTATE SPECIFIC ANTIGEN: 7.79 NG/ML (ref 0–4)

## 2021-02-19 PROCEDURE — 84153 ASSAY OF PSA TOTAL: CPT

## 2021-02-19 PROCEDURE — 36415 COLL VENOUS BLD VENIPUNCTURE: CPT

## 2021-08-18 ENCOUNTER — HOSPITAL ENCOUNTER (OUTPATIENT)
Age: 61
Discharge: HOME | End: 2021-08-18
Payer: COMMERCIAL

## 2021-08-18 LAB
BUN BLDV-MCNC: 13 MG/DL (ref 7–20)
CREAT SERPL-MCNC: 0.8 MG/DL (ref 0.8–1.3)
GFR AFRICAN AMERICAN: >60
GFR NON-AFRICAN AMERICAN: >60
PROSTATE SPECIFIC ANTIGEN: 7.09 NG/ML (ref 0–4)

## 2021-08-18 PROCEDURE — 84520 ASSAY OF UREA NITROGEN: CPT

## 2021-08-18 PROCEDURE — 82565 ASSAY OF CREATININE: CPT

## 2021-08-18 PROCEDURE — 84153 ASSAY OF PSA TOTAL: CPT

## 2021-08-18 PROCEDURE — 36415 COLL VENOUS BLD VENIPUNCTURE: CPT

## 2022-02-17 ENCOUNTER — HOSPITAL ENCOUNTER (OUTPATIENT)
Age: 62
Discharge: HOME | End: 2022-02-17
Payer: COMMERCIAL

## 2022-02-17 LAB — PROSTATE SPECIFIC ANTIGEN: 7.56 NG/ML (ref 0–4)

## 2022-02-17 PROCEDURE — 36415 COLL VENOUS BLD VENIPUNCTURE: CPT

## 2022-02-17 PROCEDURE — 84153 ASSAY OF PSA TOTAL: CPT

## 2022-08-23 ENCOUNTER — OFFICE VISIT (OUTPATIENT)
Dept: CARDIOLOGY CLINIC | Age: 62
End: 2022-08-23
Payer: COMMERCIAL

## 2022-08-23 VITALS
SYSTOLIC BLOOD PRESSURE: 100 MMHG | WEIGHT: 177.9 LBS | BODY MASS INDEX: 25.47 KG/M2 | HEIGHT: 70 IN | DIASTOLIC BLOOD PRESSURE: 70 MMHG | HEART RATE: 72 BPM | OXYGEN SATURATION: 98 %

## 2022-08-23 DIAGNOSIS — I34.0 MITRAL VALVE INSUFFICIENCY, UNSPECIFIED ETIOLOGY: Primary | ICD-10-CM

## 2022-08-23 DIAGNOSIS — E78.2 MIXED HYPERLIPIDEMIA: ICD-10-CM

## 2022-08-23 PROCEDURE — 99214 OFFICE O/P EST MOD 30 MIN: CPT | Performed by: INTERNAL MEDICINE

## 2022-08-23 NOTE — PROGRESS NOTES
2022    PATIENT: Donavon Beltran  : 1960    Primary Care Provider:   Johnie Hickey MD  V:[phone]  K:[phone]    Reason for evaluation:   Chief Complaint   Patient presents with    Other     Mitral valve insufficiency, no cardiac symptoms at this time    1 Year Follow Up    Fatigue     unchanged       History of present illness:   Mr. Donavon Beltran is a 60 y.o. male patient here in annual follow-up for hyperlipidemia and mitral regurgitation. Marian Smith is a  and his wife is a retired RN from [employer]. He completed CT coronary calcium scoring and has not been in a statin benefit group. He continues to have normal body weight and low normal blood pressures. He continues to deny chest pain, shortness of breath, palpitations, lightheadedness, dizziness or syncope. He continues to endorse fatigue. No exertional concerns \"once get going\". His wife states that he always had a component of falling asleep easily since she  him at age 21. Neither feel that it has changed. States he noticed he slept better when he used to take Zyrtec daily. He has had sleep apnea work-up as well as continued follow-up with Dr. Brandon Burnett.      Medical History:      Diagnosis Date    Cancer ([facility] [address] 2019    Prostate    Hyperlipidemia        Surgical History:      Procedure Laterality Date    HERNIA REPAIR   and     HOME SLEEP STUDY  2020         TONSILLECTOMY         Social History:  Social History     Socioeconomic History    Marital status:      Spouse name: Not on file    Number of children: Not on file    Years of education: Not on file    Highest education level: Not on file   Occupational History    Not on file   Tobacco Use    Smoking status: Never    Smokeless tobacco: Never   Substance and Sexual Activity    Alcohol use: Yes     Comment: 2-7 wkly    Drug use: Not on file    Sexual activity: Not on file   Other Topics Concern    Not on file   Social History Narrative    Not on file     Social Determinants of Health     Financial Resource Strain: Not on file   Food Insecurity: Not on file   Transportation Needs: Not on file   Physical Activity: Not on file   Stress: Not on file   Social Connections: Not on file   Intimate Partner Violence: Not on file   Housing Stability: Not on file        Family History:  No evidence for sudden cardiac death or premature CAD. Problem Relation Age of Onset    Hypertension Mother     Uterine Cancer Mother     Thyroid Cancer Sister         breast    Stroke Maternal Grandmother     Cancer Brother         coln       Medications:  [x] Medications and dosages reviewed. Prior to Admission medications    Medication Sig Start Date End Date Taking?  Authorizing Provider   cetirizine (ZYRTEC) 10 MG tablet Take 10 mg by mouth three times a week    Yes Historical Provider, MD   Multiple Vitamin (MULTI VITAMIN DAILY PO) Take by mouth   Yes Historical Provider, MD       Allergies:  Pcn [penicillins]     Review of Systems:    [x]Full ROS obtained and negative except as mentioned in HPI    Physical Examination:    /70 (Site: Left Upper Arm, Position: Sitting)   Pulse 72   Ht 5' 10\" (1.778 m)   Wt 177 lb 14.4 oz (80.7 kg)   SpO2 98%   BMI 25.53 kg/m²   Wt Readings from Last 3 Encounters:   08/23/22 177 lb 14.4 oz (80.7 kg)   12/18/20 183 lb 9.6 oz (83.3 kg)   03/18/20 181 lb (82.1 kg)     Vitals:    08/23/22 1510   BP: 100/70   Pulse: 72   SpO2: 98%     GENERAL: Well developed, well nourished, no acute distress  NEUROLOGICAL: Alert and oriented x3  PSYCH: Normal mood and affect   SKIN: Warm and dry  HEENT: Normocephalic, atraumatic, Sclera non-icteric, mucous membranes moist  NECK: supple, JVP normal  CARDIAC: Normal PMI, regular rate and rhythm, normal S1S2, no murmur, rub, or gallop  RESPIRATORY: Normal respiratory effort, clear to auscultation bilaterally  EXTREMITIES: no edema or clubbing, +2 pulses bilaterally   MUSCULOSKELETAL: No joint swelling or tenderness, no chest wall tenderness  GASTROINTESTINAL: normal bowel sounds, soft, non-tender      Labs:  Lab Review   No visits with results within 2 Month(s) from this visit. Latest known visit with results is:   Hospital Outpatient Visit on 02/17/2022   Component Date Value    PSA 02/17/2022 7.56 (A)       Imaging:  I have reviewed the below testing personally:    Stress echo   12/12/19   Summary   -Normal left ventricle size, wall thickness, and systolic function with an   estimated ejection fraction of 60%. -No regional wall motion abnormalities are seen. -Mild mitral valve prolapse noted involving both mitral valve leaflets.   -There is moderate eccentric mitral regurgitation noted. -There is mild tricuspid regurgitation with a RVSP estimation of 28 mmHg.   -Normal diastolic function. Avg. E/e'=11.85  Normal stress ECHO. 12/18/20 TTE   Summary   Normal left ventricle size, wall thickness and systolic function with an   estimated ejection fraction of 60%. No regional wall motion abnormalities are seen. Normal diastolic function. Mitral valve leaflets appear thickened and myxomatous. There is prolapse of   the anterior and posterior mitral valve leaflets resulting in moderate   mitral regurgitation. The left atrium is mildly dilated. Mild tricuspid regurgitation with a PASP of 20 mmHg. CT Calcium Score 1/28/21  FINDINGS:   LEFT MAIN: Zero (0). RIGHT CORONARY ARTERY: Zero (0). LEFT ANTERIOR DESCENDING: Zero (0). CIRCUMFLEX: Zero (0). TOTAL AGATSTON CALCIUM SCORE: Zero (0). EXTRACARDIAC STRUCTURES: No evidence of mediastinal or hilar lymphadenopathy. No pericardial effusion. No cardiomegaly. No evidence of acute process in the lungs. No noncalcified nodules are noted in the lungs. Limited images of the upper abdomen are grossly unremarkable.   Visualized osseous structures demonstrate age related degenerative changes without acute abnormality. The 10-year ASCVD risk score (Rommel Marie, et al., 2013) is: 6.7%    Values used to calculate the score:      Age: 60 years      Sex: Male      Is Non- : No      Diabetic: No      Tobacco smoker: No      Systolic Blood Pressure: 075 mmHg      Is BP treated: No      HDL Cholesterol: 64 mg/dL      Total Cholesterol: 221 mg/dL    Impression/Recommendations    Mr. Radames Vasquez is a 60 y.o. male patient with:    Mitral regurgitation   Hyperlipidemia, untreated (11/2020:  TG 71 HDL 64 )  Hx. Prostate cancer    CT Calcium Score completed following last visit was zero. Remains without statin indication; ASCVD 10 year risk <7.5%. Update 2020 echo for mitral valve prolapse with moderate regurgitation. Asymptomatic. Low normal blood pressures. Orders Placed This Encounter   Procedures    Echo 2D w doppler w color complete     Standing Status:   Future     Standing Expiration Date:   8/23/2023     Order Specific Question:   Reason for exam:     Answer:   mitral valve regurgitation       Return for Echocardiogram.  Patient Instructions   Echocardiogram at our [facility]    Thank you for allowing me to participate in the care of your patient. Please do not hesitate to call. Jana Lewis DO, [facility] [place]     o: [phone]  [address] [place] [address], [address] [facility], [address] [place] [address]      NOTE:  This report was transcribed using voice recognition software. Every effort was made to ensure accuracy; however, inadvertent computerized transcription errors may be present. Scribe's Attestation: This note was scribed in the presence of Dr. Radha Werner DO by Luis Fernando Savage RN.    I, Jana Lewis, have personally performed the services described in this documentation as scribed by Denilson Denton RN in my presence, and it is both accurate and complete. An electronic signature was used to authenticate this note.

## 2022-08-24 ENCOUNTER — TELEPHONE (OUTPATIENT)
Dept: CARDIOLOGY CLINIC | Age: 62
End: 2022-08-24

## 2022-08-24 NOTE — LETTER
[facility] CARDIOLOGY[address]  Dept: [phone]  Dept Fax: [phone]      2022    Patient:Isidoro Becker  : 1960  DOS: 2022    To Whom it May Concern:    Francia Berger has been evaluated for cardiac clearance. Francia Berger is considered at a low cardiac risk for prostate biopsy. Please let my office know if you have any questions or concerns.           Bridger Cleary, [address] [place] [address]

## 2022-08-24 NOTE — TELEPHONE ENCOUNTER
Pt was seen by [facility] on 8/23, and called in today stating that he had just gotten a call from his Urologist informing him that he need a letter of Clearance. Pt can be reached [phone]    Letter n be faxed [phone]     What type of procedure are you having? Prostate Fusion Biospy    Which physician is performing your procedure? Dr. Marnie Lin    When is your procedure scheduled for? Monday August 29th    Where are you having this procedure? [facility]    Are you taking Blood Thinners? If so what? (Name/dose/frequesncy)     Does the surgeon want you to stop your blood thinner? If so for how long?  N/A    Phone Number and Contact Name for Physicians office:  [phone]  Fax number to send information:    [phone]

## 2022-08-24 NOTE — TELEPHONE ENCOUNTER
Nava Adames has a prostate biospy schedule on Monday 8/29 and needs a clearance letter. Per [facility]NN ok to proceed. Will send clearance letter electronically.

## 2022-08-25 ENCOUNTER — TELEPHONE (OUTPATIENT)
Dept: CARDIOLOGY CLINIC | Age: 62
End: 2022-08-25

## 2022-08-25 NOTE — TELEPHONE ENCOUNTER
Mauri Peterson, wife called to confirm the fax number to the [facility] - Fax number: [phone].   Please advise

## 2022-09-21 ENCOUNTER — PROCEDURE VISIT (OUTPATIENT)
Dept: CARDIOLOGY CLINIC | Age: 62
End: 2022-09-21
Payer: COMMERCIAL

## 2022-09-21 DIAGNOSIS — I34.0 MITRAL VALVE INSUFFICIENCY, UNSPECIFIED ETIOLOGY: ICD-10-CM

## 2022-09-21 LAB
LV EF: 60 %
LVEF MODALITY: NORMAL

## 2022-09-21 PROCEDURE — 93306 TTE W/DOPPLER COMPLETE: CPT | Performed by: INTERNAL MEDICINE

## 2022-09-22 ENCOUNTER — TELEPHONE (OUTPATIENT)
Dept: CARDIOLOGY CLINIC | Age: 62
End: 2022-09-22

## 2022-09-22 DIAGNOSIS — I34.0 MITRAL VALVE INSUFFICIENCY, UNSPECIFIED ETIOLOGY: Primary | ICD-10-CM

## 2022-09-22 NOTE — TELEPHONE ENCOUNTER
Per [facility] schedule  Kathrine Cheng for JOHNNA for moderate to severe MR by recent echocardiogram. He is in agreement. Answered all questions.

## 2022-09-26 ENCOUNTER — TELEPHONE (OUTPATIENT)
Dept: CARDIOLOGY CLINIC | Age: 62
End: 2022-09-26

## 2022-09-26 NOTE — TELEPHONE ENCOUNTER
LVM to call to schedule JOHNNA w/[facility] per [facility]. Please transfer call to [facility].   Thx.

## 2022-10-02 NOTE — H&P
Department of Cardiology   Attending Pre-operative History and Physical        DIAGNOSIS:  Mitral regurgitation, fatigue    INDICATION:  Mitral regurgitation, fatigue    PROCEDURE:  transesophageal echocardiogram with moderate sedation      CHIEF COMPLAINT:  valvular heart disease, fatigue    History Obtained From:  patient, spouse    HISTORY OF PRESENT ILLNESS:      The patient is a 60 y.o. male with significant past medical history of hyperlipidemia, mild sleep apnea, mitral regurgitation who presents with fatigue and difficulty \"getting going. \" The symptoms have been progressive over time. He underwent a transthoracic echocardiogram, which revealed moderate-to-severe mitral regurgitation. JOHNNA was requested for further evaluation. Past Medical History:        Diagnosis Date    Cancer ([address] 11/2019    Prostate    Hyperlipidemia      Past Surgical History:        Procedure Laterality Date    HERNIA REPAIR  '90 and '95    HOME SLEEP STUDY  6/1/2020         TONSILLECTOMY       Medications Prior to Admission:   Medications Prior to Admission: cetirizine (ZYRTEC) 10 MG tablet, Take 10 mg by mouth three times a week   Multiple Vitamin (MULTI VITAMIN DAILY PO), Take by mouth  Allergies:  Pcn [penicillins]    Social History:    TOBACCO:   reports that he has never smoked.  He has never used smokeless tobacco.  Family History:        Problem Relation Age of Onset    Hypertension Mother     Uterine Cancer Mother     Thyroid Cancer Sister         breast    Stroke Maternal Grandmother     Cancer Brother         coln     REVIEW OF SYSTEMS:    12 point ros negative except for HPI    PHYSICAL EXAM:  @[facility][[id number]]@  VITALS:  BP (!) 143/80   Pulse 63   Resp 16   Ht 5' 10\" (1.778 m)   Wt 177 lb (80.3 kg)   SpO2 99%   BMI 25.40 kg/m²     Eyes:  Lids and lashes normal, pupils equal, round and reactive to light, extra ocular muscles intact, sclera clear, conjunctiva normal    Head/ENT:  Normocephalic, without obvious abnormality, atraumatic, sinuses nontender on palpation, external ears without lesions, oral pharynx with moist mucus membranes, tonsils without erythema or exudates, gums normal and good dentition. Neck:  Supple, symmetrical, trachea midline, no adenopathy, thyroid symmetric, not enlarged and no tenderness, skin normal    Heart:  Normal apical impulse, regular rate and rhythm, normal S1 and S2, no S3 or S4, and no murmur noted    Lungs:  No increased work of breathing, good air exchange, clear to auscultation bilaterally, no crackles or wheezing    Abdomen:  No scars, normal bowel sounds, soft, non-distended, non-tender, no masses palpated, no hepatosplenomegally    Extremities:  No clubbing, cyanosis, or edema      ASSESSMENT AND PLAN:    1. Patient is a 60 y.o. male with above specified procedure planned JOHNNA with conscious sedation    2. Procedure options, risks and benefits reviewed with patient. Patient expresses understanding.

## 2022-10-02 NOTE — SEDATION DOCUMENTATION
Brief Pre-Op Note/Sedation Assessment      Marjan Gilbert  1960  Cath/NONE      [id number]  8:15 AM    Planned Procedure: Tranesophageal echocardiogram    Post Procedure Plan: Return to same level of care    Consent: I have discussed with the patient and/or the patient representative the indication, alternatives, and the possible risks and/or complications of the planned procedure and the anesthesia methods. The patient and/or patient representative appear to understand and agree to proceed. The patient accepts the risk of tooth damage, esophageal perforation requiring surgery, vocal chord paralysis, anaphylaxis to medication, stroke, heart, aspiration, respiratory failure, heart attack, gi bleeding and death. Chief Complaint: Dyspnea  Fatigue      Indications for Cath Procedure:  Valvular Disease - Mitral Regurgitation; Regurgitation Severity: Moderately Severe (3+)  Anginal Classification within 2 weeks:  No symptoms  NYHA Heart Failure Class within 2 weeks: No symptoms  Is Cath Lab Visit Valve-related?: Mitral Regurgitation; Regurgitation Severity: Moderately Severe (3+)  Surgical Risk: N/A  Functional Type: >= 4 METS with symptoms    Anti- Anginal Meds within 2 weeks:   NA       Stress or Imaging Studies Performed (within 6 months):  Calcium score: 0    Stress echo 2019 -  Rest HR: 72 bpm                             HR Response: Normal   Rest BP: 140/87 mmHg                        BP Response: Normal   Stress Peak HR: 153 bpm                     HR BP Product: 83267   Stress Peak BP: 182/72 mmHg                 Max Exercise: 10 METS   Predicted HR: 160 bpm   % of predicted HR: 96                       Exercise Effort: Excellent   Test Duration: 11 min and 7 sec   Reason for Termination: Physiologic Maximum     Vital Signs:  BP (!) 143/80   Pulse 63   Resp 16   Ht 5' 10\" (1.778 m)   Wt 177 lb (80.3 kg)   SpO2 99%   BMI 25.40 kg/m²     Allergies:   Allergies   Allergen Reactions    Pcn [Penicillins]        Past Medical History:  Past Medical History:   Diagnosis Date    Cancer ([facility] [address] 11/2019    Prostate    Hyperlipidemia          Surgical History:  Past Surgical History:   Procedure Laterality Date    HERNIA REPAIR  '90 and '95    HOME SLEEP STUDY  6/1/2020         TONSILLECTOMY           Medications:  Current Facility-Administered Medications   Medication Dose Route Frequency Provider Last Rate Last Admin    sodium chloride flush 0.9 % injection 5-40 mL  5-40 mL IntraVENous PRN Jeannie Handler, DO               Pre-Sedation:    Pre-Sedation Documentation and Exam:  I have personally completed a history, physical exam & review of systems for this patient (see notes). Prior History of Anesthesia Complications:   none    Modified Mallampati:  III (soft palate, base of uvula visible)    ASA Classification:  Class 2 - A normal healthy patient with mild systemic disease      Teodoro Scale: Activity:  2 - Able to move 4 extremities voluntarily on command  Respiration:  2 - Able to breathe deeply and cough freely  Circulation:  2 - BP+/- 20mmHg of normal  Consciousness:  2 - Fully awake  Oxygen Saturation (color):  2 - Able to maintain oxygen saturation >92% on room air    Sedation/Anesthesia Plan:  Guard the patient's safety and welfare. Minimize physical discomfort and pain. Minimize negative psychological responses to treatment by providing sedation and analgesia and maximize the potential amnesia. Patient to meet pre-procedure discharge plan.     Medication Planned:  midazolam intravenously and fentanyl intravenously    Patient is an appropriate candidate for plan of sedation: yes      Electronically signed by Jeff Lynn MD on 10/3/2022 at 8:15 AM

## 2022-10-03 ENCOUNTER — HOSPITAL ENCOUNTER (OUTPATIENT)
Dept: CARDIAC CATH/INVASIVE PROCEDURES | Age: 62
Discharge: HOME | End: 2022-10-03
Attending: INTERNAL MEDICINE | Admitting: INTERNAL MEDICINE
Payer: COMMERCIAL

## 2022-10-03 ENCOUNTER — PATIENT MESSAGE (OUTPATIENT)
Dept: CARDIOLOGY CLINIC | Age: 62
End: 2022-10-03

## 2022-10-03 ENCOUNTER — TELEPHONE (OUTPATIENT)
Dept: CARDIOLOGY CLINIC | Age: 62
End: 2022-10-03

## 2022-10-03 VITALS
RESPIRATION RATE: 16 BRPM | WEIGHT: 177 LBS | SYSTOLIC BLOOD PRESSURE: 143 MMHG | BODY MASS INDEX: 25.34 KG/M2 | OXYGEN SATURATION: 99 % | HEART RATE: 63 BPM | HEIGHT: 70 IN | DIASTOLIC BLOOD PRESSURE: 80 MMHG

## 2022-10-03 DIAGNOSIS — I34.0 MITRAL VALVE INSUFFICIENCY, UNSPECIFIED ETIOLOGY: Primary | ICD-10-CM

## 2022-10-03 DIAGNOSIS — I34.0 MITRAL VALVE INSUFFICIENCY, UNSPECIFIED ETIOLOGY: ICD-10-CM

## 2022-10-03 LAB
LV EF: 58 %
LVEF MODALITY: NORMAL

## 2022-10-03 PROCEDURE — 93312 ECHO TRANSESOPHAGEAL: CPT

## 2022-10-03 PROCEDURE — 99152 MOD SED SAME PHYS/QHP 5/>YRS: CPT | Performed by: INTERNAL MEDICINE

## 2022-10-03 PROCEDURE — 99152 MOD SED SAME PHYS/QHP 5/>YRS: CPT

## 2022-10-03 PROCEDURE — 93320 DOPPLER ECHO COMPLETE: CPT

## 2022-10-03 PROCEDURE — 93325 DOPPLER ECHO COLOR FLOW MAPG: CPT

## 2022-10-03 PROCEDURE — 7100000010 HC PHASE II RECOVERY - FIRST 15 MIN

## 2022-10-03 RX ORDER — SODIUM CHLORIDE 0.9 % (FLUSH) 0.9 %
5-40 SYRINGE (ML) INJECTION PRN
Status: DISCONTINUED | OUTPATIENT
Start: 2022-10-03 | End: 2022-10-03 | Stop reason: HOSPADM

## 2022-10-03 NOTE — TELEPHONE ENCOUNTER
From: Loree Jerry  To: Dr. Rocío Lester: 10/3/2022 12:53 PM EDT  Subject: angiogram    I will be at a conference out of town the week of October 17-21. Would it be possible to avoid those dates for the angiogram? Oct 13-14 would also be difficult. The week of Oct 24 is clear for me. Oct 11&12 are also good.

## 2022-10-03 NOTE — TELEPHONE ENCOUNTER
Referral to Dr. Luan Ramsey   Ph: [phone]  Fax: [phone]  [address] [place] [address] Kasi Paris     Faxed referral for evaluation for MV repair    Also needs LHC to rule out CAD    Will schedule

## 2022-10-03 NOTE — PROGRESS NOTES
Brief immediate postprocedure note       JOHNNA performed w/o complications      Anesthesia: 1% viscous lidocaine, cetacaine spray   Sedation: 2 mg IV versed   Analgesia: 100 mcg IV fentanyl     Probe inserted easily and removed easily   Findings:     Anterior mitral leaflet is elongated with slight bend \"hockey-sticking\" consistent with probably rheumatic mitral disease  Posterior leaflet is tethered  There is a severe eccentric jet of MR starting from the posterior edge of the valve and directed anteriorly. Appears to involve A2/P2 scallops  Pulmonary vein flow reversal is noted  LA is dilated with spontaneous contrast  NALDO no clot  No PFO      Complications: none   EBL: none   Specimens: N/A       CONSCIOUS SEDATION: Conscious sedation was given. The medication documented above was administered during the study. There was a trained observer present throughout the   entire time during which sedation was administered. Moderate sedation time   was 20 minutes.

## 2022-10-03 NOTE — DISCHARGE INSTRUCTIONS
JOHNNA DISCHARGE INSTRUCTIONS    No driving for 24 hours. We strongly recommend that a responsible adult stay with you for the next 24 hours. Continue Medications.     Advance diet as tolerated    Contact physician office  for following symptoms:  Fever  Difficulty swallowing  Chest pain  Difficulty breathing  Bleeding

## 2022-10-04 ENCOUNTER — TELEPHONE (OUTPATIENT)
Dept: CARDIOLOGY CLINIC | Age: 62
End: 2022-10-04

## 2022-10-04 NOTE — TELEPHONE ENCOUNTER
AMI to schedule [facility] w/[facility]. Please transfer call to [phone].  Thtj

## 2022-10-11 ENCOUNTER — HOSPITAL ENCOUNTER (OUTPATIENT)
Dept: CARDIAC CATH/INVASIVE PROCEDURES | Age: 62
Discharge: HOME | End: 2022-10-11
Attending: INTERNAL MEDICINE | Admitting: INTERNAL MEDICINE
Payer: COMMERCIAL

## 2022-10-11 VITALS
SYSTOLIC BLOOD PRESSURE: 133 MMHG | HEART RATE: 64 BPM | RESPIRATION RATE: 16 BRPM | TEMPERATURE: 97.9 F | DIASTOLIC BLOOD PRESSURE: 79 MMHG | BODY MASS INDEX: 25.05 KG/M2 | WEIGHT: 175 LBS | HEIGHT: 70 IN

## 2022-10-11 LAB
ANION GAP SERPL CALCULATED.3IONS-SCNC: 9 MMOL/L (ref 3–16)
BUN BLDV-MCNC: 15 MG/DL (ref 7–20)
CALCIUM SERPL-MCNC: 9.3 MG/DL (ref 8.3–10.6)
CHLORIDE BLD-SCNC: 106 MMOL/L (ref 99–110)
CO2: 26 MMOL/L (ref 21–32)
CREAT SERPL-MCNC: 1.1 MG/DL (ref 0.8–1.3)
EKG ATRIAL RATE: 64 BPM
EKG DIAGNOSIS: NORMAL
EKG P AXIS: 31 DEGREES
EKG P-R INTERVAL: 164 MS
EKG Q-T INTERVAL: 410 MS
EKG QRS DURATION: 106 MS
EKG QTC CALCULATION (BAZETT): 422 MS
EKG R AXIS: -9 DEGREES
EKG T AXIS: 43 DEGREES
EKG VENTRICULAR RATE: 64 BPM
GFR AFRICAN AMERICAN: >60
GFR NON-AFRICAN AMERICAN: >60
GLUCOSE BLD-MCNC: 89 MG/DL (ref 70–99)
HCT VFR BLD CALC: 44 % (ref 40.5–52.5)
HEMOGLOBIN: 14.8 G/DL (ref 13.5–17.5)
LEFT VENTRICULAR EJECTION FRACTION MODE: NORMAL
LV EF: 55 %
MCH RBC QN AUTO: 29.9 PG (ref 26–34)
MCHC RBC AUTO-ENTMCNC: 33.6 G/DL (ref 31–36)
MCV RBC AUTO: 89.2 FL (ref 80–100)
PDW BLD-RTO: 13.1 % (ref 12.4–15.4)
PLATELET # BLD: 160 K/UL (ref 135–450)
PMV BLD AUTO: 8.5 FL (ref 5–10.5)
POTASSIUM SERPL-SCNC: 3.9 MMOL/L (ref 3.5–5.1)
RBC # BLD: 4.94 M/UL (ref 4.2–5.9)
SODIUM BLD-SCNC: 141 MMOL/L (ref 136–145)
WBC # BLD: 3.8 K/UL (ref 4–11)

## 2022-10-11 PROCEDURE — 80048 BASIC METABOLIC PNL TOTAL CA: CPT

## 2022-10-11 PROCEDURE — 85027 COMPLETE CBC AUTOMATED: CPT

## 2022-10-11 PROCEDURE — 93010 ELECTROCARDIOGRAM REPORT: CPT | Performed by: INTERNAL MEDICINE

## 2022-10-11 PROCEDURE — 93458 L HRT ARTERY/VENTRICLE ANGIO: CPT | Performed by: INTERNAL MEDICINE

## 2022-10-11 PROCEDURE — C1769 GUIDE WIRE: HCPCS

## 2022-10-11 PROCEDURE — 99152 MOD SED SAME PHYS/QHP 5/>YRS: CPT

## 2022-10-11 PROCEDURE — 2709999900 HC NON-CHARGEABLE SUPPLY

## 2022-10-11 PROCEDURE — 93458 L HRT ARTERY/VENTRICLE ANGIO: CPT

## 2022-10-11 PROCEDURE — 99152 MOD SED SAME PHYS/QHP 5/>YRS: CPT | Performed by: INTERNAL MEDICINE

## 2022-10-11 PROCEDURE — 6360000002 HC RX W HCPCS

## 2022-10-11 PROCEDURE — 93005 ELECTROCARDIOGRAM TRACING: CPT | Performed by: INTERNAL MEDICINE

## 2022-10-11 PROCEDURE — 2500000003 HC RX 250 WO HCPCS

## 2022-10-11 PROCEDURE — 6360000004 HC RX CONTRAST MEDICATION: Performed by: INTERNAL MEDICINE

## 2022-10-11 PROCEDURE — C1894 INTRO/SHEATH, NON-LASER: HCPCS

## 2022-10-11 PROCEDURE — 99153 MOD SED SAME PHYS/QHP EA: CPT

## 2022-10-11 PROCEDURE — 36415 COLL VENOUS BLD VENIPUNCTURE: CPT

## 2022-10-11 RX ORDER — SODIUM CHLORIDE 0.9 % (FLUSH) 0.9 %
5-40 SYRINGE (ML) INJECTION PRN
Status: DISCONTINUED | OUTPATIENT
Start: 2022-10-11 | End: 2022-10-11 | Stop reason: HOSPADM

## 2022-10-11 RX ADMIN — IOPAMIDOL 88 ML: 755 INJECTION, SOLUTION INTRAVENOUS at 08:23

## 2022-10-11 NOTE — DISCHARGE INSTRUCTIONS
Radial Angiogram      Care of your puncture site:  Remove clear bandage 24 hours after the procedure. May shower in 24 hours  Inspect the site daily and gently clean using soap and water. Dry thoroughly and apply a Band-Aid. Normal Observations:  Soreness or tenderness which may last one week. Mild oozing from the incision site. Possible bruising that could last 2 weeks. Activity:  You may resume driving 24 hours following the procedure. You may resume normal activity in 3 days or after the wound heals. Avoid lifting more than 10 pounds for 3 days with affected arm. Nutrition:  Regular diet. Drink at least 8 to 10 glasses of decaffeinated, non-alcoholic fluid for the next 24 hours to flush the x-ray dye used for your angiogram out of your body. Call your doctor immediately if your condition worsens, for any other concerns, for a follow-up appointment or if you experience any of the following:  Significant bleeding that does not stop after 10 minutes of applying firm pressure on the puncture site. Increased swelling of the wrist.  Unusual pain, numbness, or tingling of the wrist/arm. Any signs of infection such as: redness, yellow drainage at the site, swelling or pain.

## 2022-10-11 NOTE — OP NOTE
Cardiac Catheterization     Procedure: LHC, LVG   Complications: None  Medications: Procedural sedation with minimal conscious sedation (3 Versed/100 Fentanyl)  An independent trained observer pushed medications at my direction. We monitored the patient's level of consciousness and vital signs/physiologic status throughout the procedure duration (see start and stop times in log). Estimated Blood Loss: Less than 20 mL  Specimens: were not obtained  Access: 6 Fr Right Radial   Closure: TR band  Contrast: 88 cc  Start time: 0748  Stop time: 0818  Fluoro time: 2.1  Findings:     Left Heart Cath  Dominance: Right      LM: normal  LAD: normal  LCx: normal  RCA: normal    LVEDP: 14 mmHg  LVEF: 55%    Impression/Recommendations:  Normal coronary study.        Ann Cee DO, [address] [facility]  Interventional Cardiology     o: [phone]  [address] [place] [address], [address]

## 2022-10-11 NOTE — PRE SEDATION
Pre-Op Note/Sedation Assessment    Ghislaine Moyer  1960  [id number]  7:50 AM    Planned Procedure: Cardiac Catheterization Procedure  Post Procedure Plan: Return to same level of care  Consent: I have discussed with the patient and/or the patient representative the indication, alternatives, and the possible risks and/or complications of the planned procedure and the anesthesia methods. The patient and/or patient representative appear to understand and agree to proceed. Chief Complaint:   Pre-Op Clearance      Indications for Cath Procedure:  Presentation:  Valvular Disease - Mitral Regurgitation; Regurgitation Severity: Severe (4+)  2. Anginal Classification within 2 weeks:  No symptoms  3. Angina Symptoms Assessment:  Asymptomatic  4. Heart Failure Class within last 2 weeks:  No symptoms  5. Cardiovascular Instability:  No    Prior Ischemic Workup/Eval:  Pre-Procedural Medications:  No  2. Stress Test Completed? No    Does Patient need surgery?   Cath Valve Surgery:  Yes:  Mitral Regurgitation; Regurgitation Severity: Severe (4+) Intermediate >= 4 METS without symptoms    Pre-Procedure Medical History:  Vital Signs:  /79   Pulse 64   Temp 97.9 °F (36.6 °C) (Temporal)   Resp 16   Ht 5' 10\" (1.778 m)   Wt 175 lb (79.4 kg)   BMI 25.11 kg/m²     Allergies:  No Active Allergies  Medications:    Current Facility-Administered Medications   Medication Dose Route Frequency Provider Last Rate Last Admin    sodium chloride flush 0.9 % injection 5-40 mL  5-40 mL IntraVENous PRN Junie Wilson DO           Past Medical History:    Past Medical History:   Diagnosis Date    Cancer ([address] 11/2019    Prostate    Hyperlipidemia        Surgical History:    Past Surgical History:   Procedure Laterality Date    HERNIA REPAIR  '90 and '95    HOME SLEEP STUDY  6/1/2020         TONSILLECTOMY         Pre-Sedation:  Pre-Sedation Documentation and Exam:  I have assessed the patient and reviewed the H&P on the chart. Prior History of Anesthesia Complications:   none    Modified Mallampati:  II (soft palate, uvula, fauces visible)    ASA Classification:  Class 2 - A normal healthy patient with mild systemic disease    Teodoro Scale: Activity:  2 - Able to move 4 extremities voluntarily on command  Respiration:  2 - Able to breathe deeply and cough freely  Circulation:  2 - BP+/- 20mmHg of normal  Consciousness:  2 - Fully awake  Oxygen Saturation (color):  2 - Able to maintain oxygen saturation >92% on room air    Sedation/Anesthesia Plan:  Guard the patient's safety and welfare. Minimize physical discomfort and pain. Minimize negative psychological responses to treatment by providing sedation and analgesia and maximize the potential amnesia. Patient to meet pre-procedure discharge plan.     Medication Planned:  midazolam intravenously and fentanyl intravenously    Patient is an appropriate candidate for plan of sedation:   yes      Negra Brewster DO, [facility] [place]  Interventional Cardiology     o: [phone]  [address],  Gianna Nieves, [facility]

## 2022-10-11 NOTE — H&P
Cardiovascular History & Physical     PATIENT: Ilana Betancourt  : 1960  MRN: [id number]    Chief complaint:   Mitral Regurgitation, severe    History of present illness:   Mr. Ilana Betancourt is a 60 y.o. male patient here for left heart catheterization after recent JOHNNA confirmed severe mitral regurgitation. He also has a history of hyperlipidemia. Rosette Hernández is a  and his wife is a retired RN from [employer]. He completed CT coronary calcium scoring and has not historically been in a statin benefit group. He continues to have normal body weight and low normal blood pressures. He continues to deny chest pain, shortness of breath, palpitations, lightheadedness, dizziness or syncope. He continues to endorse fatigue. No exertional concerns \"once get going\". His wife states that he always had a component of falling asleep easily since she  him at age 21. Neither feel that it has changed. States he noticed he slept better when he used to take Zyrtec daily. He has had sleep apnea work-up as well as continued follow-up with Dr. Mika Obregon.     Medical History:      Diagnosis Date    Cancer ([facility] [address] 2019    Prostate    Hyperlipidemia        Surgical History:      Procedure Laterality Date    HERNIA REPAIR   and     HOME SLEEP STUDY  2020         TONSILLECTOMY         Social History:  Social History     Socioeconomic History    Marital status:      Spouse name: Not on file    Number of children: Not on file    Years of education: Not on file    Highest education level: Not on file   Occupational History    Not on file   Tobacco Use    Smoking status: Never    Smokeless tobacco: Never   Substance and Sexual Activity    Alcohol use: Yes     Comment: 2-7 wkly    Drug use: Not on file    Sexual activity: Not on file   Other Topics Concern    Not on file   Social History Narrative    Not on file     Social Determinants of Health     Financial Resource Strain: Not on file   Food Insecurity: Not on file   Transportation Needs: Not on file   Physical Activity: Not on file   Stress: Not on file   Social Connections: Not on file   Intimate Partner Violence: Not on file   Housing Stability: Not on file        Family History:  No evidence for sudden cardiac death or premature CAD. Problem Relation Age of Onset    Hypertension Mother     Uterine Cancer Mother     Thyroid Cancer Sister         breast    Stroke Maternal Grandmother     Cancer Brother         coln       Medications:  Prior to Admission medications    Medication Sig Start Date End Date Taking? Authorizing Provider   cetirizine (ZYRTEC) 10 MG tablet Take 10 mg by mouth three times a week     Historical Provider, MD   Multiple Vitamin (MULTI VITAMIN DAILY PO) Take by mouth    Historical Provider, MD       Allergies:  Patient has no active allergies.      Review of Systems:   [x]Full ROS obtained and negative except as mentioned in HPI    Physical Examination:    /79   Pulse 64   Temp 97.9 °F (36.6 °C) (Temporal)   Resp 16   Ht 5' 10\" (1.778 m)   Wt 175 lb (79.4 kg)   BMI 25.11 kg/m²   Wt Readings from Last 3 Encounters:   10/11/22 175 lb (79.4 kg)   10/03/22 177 lb (80.3 kg)   08/23/22 177 lb 14.4 oz (80.7 kg)       GENERAL: Well developed, well nourished, no acute distress  NEUROLOGICAL: Alert and oriented x3  PSYCH: Normal mood and affect   SKIN: Warm and dry, without lesions  HEENT: Normocephalic, atraumatic, Sclera non-icteric, mucous membranes moist  NECK: supple, JVP normal, thyroid not enlarged   CAROTID: Normal upstroke, no bruits  CARDIAC: Normal PMI, regular rate and rhythm, normal S1S2, no murmur, rub  RESPIRATORY: Normal respiratory effort, clear to auscultation bilaterally  EXTREMITIES: No cyanosis, clubbing or edema, palpable pulses bilaterally   MUSCULOSKELETAL: No joint swelling or tenderness, no chest wall tenderness  GASTROINTESTINAL:  soft, non-tender, no bruit    Labs:  Lab Review abnormal). -The tip of the anterior and posterior leaflets appears to be slightly   bulbous, and is suggestive of some myxomatous change and mild to moderate   prolapse. -Moderate to severe mitral regurgitation. ERO 0.35 cm2.   -Mild tricuspid regurgitation.   -Estimated pulmonary artery systolic pressure is normal at 22-27 mmHg   assuming a right atrial pressure of 3 mmHg. JOHNNA 10/3/22   Summary   Left ventricular cavity size is normal with normal left ventricular wall   thickness. Normal LV systolic function estimated LVEF 55-60%   Anterior mitral leaflet is elongated with mild prolapse of midsection and a   somewhat hockey-stick motion. Posterior leaflet appears shortened. There is lack of coaptation of A2/P2 resulting in severe eccentric mitral   regurgitation. 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 CT Calcium Score 1/28/21  FINDINGS:   LEFT MAIN: Zero (0). RIGHT CORONARY ARTERY: Zero (0). LEFT ANTERIOR DESCENDING: Zero (0). CIRCUMFLEX: Zero (0). TOTAL AGATSTON CALCIUM SCORE: Zero (0). EXTRACARDIAC STRUCTURES: No evidence of mediastinal or hilar lymphadenopathy. No pericardial effusion. No cardiomegaly. No evidence of acute process in the lungs. No noncalcified nodules are noted in the lungs. Limited images of the upper abdomen are grossly unremarkable. Visualized osseous structures demonstrate age related degenerative changes without acute abnormality. 10/11/22 ECG  Normal sinus rhythm  Incomplete right bundle branch block    Impression/Recommendations    Mr. Paalk Mayes is a 60 y.o. male patient     Mitral regurgitation, severe  Hyperlipidemia, untreated and due for recheck (11/2020:  TG 71 HDL 64 )  Hx.  Prostate cancer      Risks, benefits, goals, and alternatives of left heart catheterization with the potential for percutaneous coronary intervention discussed with patient; including stroke, heart attack, kidney damage, death, paralysis, disability, damage to nerves/arteries/veins. All questions answered and informed consent obtained. Further recommendations pending coronary angiography and clinical course.         Eduard Hope D.O., [facility] [place]  Interventional Cardiology     o: [phone]  [address] [facility], [address] [place] [address]

## 2022-11-15 ENCOUNTER — TELEPHONE (OUTPATIENT)
Dept: CARDIOLOGY CLINIC | Age: 62
End: 2022-11-15

## 2022-11-15 NOTE — TELEPHONE ENCOUNTER
Katy Babinski calling from Dr. Ileana Whitehead' office calling to request [facility] report and images. She cannot access this info in Care Everywhere. Please fax this info to [phone].

## 2022-11-16 NOTE — TELEPHONE ENCOUNTER
I called the Cath lab and spoke with Esvin Liang. She told me that a request must be faxed and they will push it through, via Krissy Sears.  I called Dr Alba Balloon office back and spoke to an associate and told her to fax a request to [phone] ATTN: Esvin Liang

## 2022-11-18 ENCOUNTER — TELEPHONE (OUTPATIENT)
Dept: CARDIOLOGY CLINIC | Age: 62
End: 2022-11-18

## 2022-11-18 NOTE — TELEPHONE ENCOUNTER
Faxed cath report  [facility] cardiology and informed them  Omayra Hinojosa verified she did receive the fax.

## 2022-11-18 NOTE — TELEPHONE ENCOUNTER
Shira/Ramirez calling to get Cath Report faxed to [phone] for consult. Cath Lab states they faxed it, but having trouble getting it. Thank you.

## 2023-01-09 ENCOUNTER — TELEPHONE (OUTPATIENT)
Dept: CARDIOLOGY CLINIC | Age: 63
End: 2023-01-09

## 2023-01-09 NOTE — TELEPHONE ENCOUNTER
Pt had a mitral valve repair and is needing a 4 wk BNN fu the last week in Jan or 1st week in Feb. Please advise and call to schedule.

## 2023-01-09 NOTE — TELEPHONE ENCOUNTER
No availability with BNN the last week Jan/first week of Feb  Can schedule 2/14 at 1515 or he can see NP the last week Jan/first week Feb  TY

## 2023-01-10 NOTE — TELEPHONE ENCOUNTER
Pt called to schedule with BNN I offer him 02/14 as per [facility] and the Pt was dissatisfied and he hung up.   Please advise

## 2023-01-23 PROBLEM — Z98.890 S/P MITRAL VALVE REPAIR: Status: ACTIVE | Noted: 2023-01-23

## 2023-01-23 NOTE — PROGRESS NOTES
2023    PATIENT: Damaris Calles  : 1960    Primary Care Provider:   Nir Hernandez MD  [phone]  K:[phone]    Reason for evaluation:   Chief Complaint   Patient presents with    Follow-up     surgery     History of present illness:   Mr. Damaris Calles is a 60 y.o. male patient here in CV follow up after recent mitral valve repair with Dr. Kindra Mcmullen earlier this month. His wife is a retired registered nurse and they were both very pleased with his care in hospital.  He has transitioned home without concern. No incisional pain. He is walking 40 minutes briskly daily without concern. He states that he previously would get short of breath sooner than he felt he should have. When asked in several ways, he denies any breathing concerns. No palpitations lightheadedness or syncope. No edema.       Medical History:      Diagnosis Date    Cancer ([facility] [address] 2019    Prostate    Hyperlipidemia        Surgical History:      Procedure Laterality Date    HERNIA REPAIR   and     HOME SLEEP STUDY  2020         MITRAL VALVE REPAIR  2023    TONSILLECTOMY         Social History:  Social History     Socioeconomic History    Marital status:      Spouse name: Not on file    Number of children: Not on file    Years of education: Not on file    Highest education level: Not on file   Occupational History    Not on file   Tobacco Use    Smoking status: Never    Smokeless tobacco: Never   Substance and Sexual Activity    Alcohol use: Yes     Comment: 2-7 wkly    Drug use: Not on file    Sexual activity: Not on file   Other Topics Concern    Not on file   Social History Narrative    Not on file     Social Determinants of Health     Financial Resource Strain: Not on file   Food Insecurity: Not on file   Transportation Needs: Not on file   Physical Activity: Not on file   Stress: Not on file   Social Connections: Not on file   Intimate Partner Violence: Not on file   Housing Stability: Not on file        Family History:  No evidence for sudden cardiac death or premature CAD. Problem Relation Age of Onset    Hypertension Mother     Uterine Cancer Mother     Thyroid Cancer Sister         breast    Stroke Maternal Grandmother     Cancer Brother         coln       Medications:  [x] Medications and dosages reviewed. Prior to Admission medications    Medication Sig Start Date End Date Taking? Authorizing Provider   metoprolol tartrate (LOPRESSOR) 25 MG tablet Take 12.5 mg by mouth 2 times daily 1/6/23  Yes Historical Provider, MD   atorvastatin (LIPITOR) 10 MG tablet TAKE 1 TABLET BY MOUTH EVERY NIGHT AT BEDTIME 1/6/23  Yes Historical Provider, MD   aspirin 81 MG chewable tablet Take 81 mg by mouth daily 1/6/23  Yes Historical Provider, MD   cetirizine (ZYRTEC) 10 MG tablet Take 10 mg by mouth three times a week    Yes Historical Provider, MD   Multiple Vitamin (MULTI VITAMIN DAILY PO) Take by mouth   Yes Historical Provider, MD       Allergies:  Patient has no known allergies.      Review of Systems:    [x]Full ROS obtained and negative except as mentioned in HPI    Physical Examination:    /64 (Site: Right Upper Arm, Position: Sitting, Cuff Size: Medium Adult)   Pulse 91   Ht 5' 10\" (1.778 m)   Wt 176 lb 4.8 oz (80 kg)   SpO2 97%   BMI 25.30 kg/m²   Wt Readings from Last 3 Encounters:   01/24/23 176 lb 4.8 oz (80 kg)   10/11/22 175 lb (79.4 kg)   10/03/22 177 lb (80.3 kg)     Vitals:    01/24/23 0810   BP: 116/64   Pulse: 91   SpO2: 97%       GENERAL: Well developed, well nourished, no acute distress  NEUROLOGICAL: Alert and oriented x3  PSYCH: Normal mood and affect   SKIN: Warm and dry  HEENT: Normocephalic, atraumatic, Sclera non-icteric, mucous membranes moist  NECK: supple, JVP normal  CARDIAC: Normal PMI, regular rate and rhythm, normal S1S2, no murmur, rub, or gallop  RESPIRATORY: Normal respiratory effort, clear to auscultation bilaterally  EXTREMITIES: no edema or clubbing, +2 pulses bilaterally   MUSCULOSKELETAL: No joint swelling or tenderness, no chest wall tenderness  GASTROINTESTINAL: normal bowel sounds, soft, non-tender      Labs:  Lab Review   No visits with results within 2 Month(s) from this visit. Latest known visit with results is:   Admission on 10/11/2022, Discharged on 10/11/2022   Component Date Value    WBC 10/11/2022 3.8 (A)     RBC 10/11/2022 4.94     Hemoglobin 10/11/2022 14.8     Hematocrit 10/11/2022 44.0     MCV 10/11/2022 89.2     MCH 10/11/2022 29.9     MCHC 10/11/2022 33.6     RDW 10/11/2022 13.1     Platelets 74/05/4110 160     MPV 10/11/2022 8.5     Sodium 10/11/2022 141     Potassium 10/11/2022 3.9     Chloride 10/11/2022 106     CO2 10/11/2022 26     Anion Gap 10/11/2022 9     Glucose 10/11/2022 89     BUN 10/11/2022 15     Creatinine 10/11/2022 1.1     GFR Non- 10/11/2022 >60     GFR  10/11/2022 >60     Calcium 10/11/2022 9.3     Ventricular Rate 10/11/2022 64     Atrial Rate 10/11/2022 64     P-R Interval 10/11/2022 164     QRS Duration 10/11/2022 106     Q-T Interval 10/11/2022 410     QTc Calculation (Bazett) 10/11/2022 422     P Axis 10/11/2022 31     R Axis 10/11/2022 -9     T Axis 10/11/2022 43     Diagnosis 10/11/2022 Normal sinus rhythmIncomplete right bundle branch blockConfirmed by Estefana Rubinstein ([id number]) on 10/11/2022 11:42:33 AM     Left Ventricular Ejectio* 10/11/2022 55     LEFT VENTRICULAR EJECTIO* 10/11/2022 Cardiac Cath        Imaging:  I have reviewed the below testing personally:    10/11/22  Left Heart Cath  Dominance: Right       LM: normal  LAD: normal  LCx: normal  RCA: normal     LVEDP: 14 mmHg  LVEF: 55%      Stress echo 12/12/19   Summary   -Normal left ventricle size, wall thickness, and systolic function with an   estimated ejection fraction of 60%. -No regional wall motion abnormalities are seen.    -Mild mitral valve prolapse noted involving both mitral valve leaflets.   -There is moderate eccentric mitral regurgitation noted. -There is mild tricuspid regurgitation with a RVSP estimation of 28 mmHg.   -Normal diastolic function. Avg. E/e'=11.85  Normal stress ECHO. 12/18/20 TTE   Summary   Normal left ventricle size, wall thickness and systolic function with an   estimated ejection fraction of 60%. No regional wall motion abnormalities are seen. Normal diastolic function. Mitral valve leaflets appear thickened and myxomatous. There is prolapse of   the anterior and posterior mitral valve leaflets resulting in moderate   mitral regurgitation. The left atrium is mildly dilated. Mild tricuspid regurgitation with a PASP of 20 mmHg. JOHNNA 10/3/22 [facility] Pares)   Summary   Left ventricular cavity size is normal with normal left ventricular wall   thickness. Normal LV systolic function estimated LVEF 55-60%   Anterior mitral leaflet is elongated with mild prolapse of midsection and a   somewhat hockey-stick motion. Posterior leaflet appears shortened. There is lack of coaptation of A2/P2 resulting in severe eccentric mitral   regurgitation. 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 Post op TTE 1/6/2023 [facility])  Study Conclusions  - Left ventricle: There is mild concentric hypertrophy. Systolic  function was mildly reduced. The calculated ejection fraction was  in the range of 41% to 46%. Mild diffuse hypokinesis. The stroke  volume is 77ml. The stroke index is 39ml/m^2. 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 The  valve area (LVOT continuity) is 1.7cm^2.  - Right ventricle: The cavity size is normal. Wall thickness is  normal. Systolic function is normal.  - Right atrium: The atrium is normal in size. - Inferior vena cava: The vessel was normal in size; the  respirophasic diameter changes were in the normal range (&gt;= 50%);  findings are consistent with normal central venous pressure.     12/2022: TC 249 TG 94 HDL 69 )    Impression/Recommendations    Mr. Jo Ann Michelle is a 60 y.o. male patient with:    Mitral insufficiency s/p robotic MV repair (32 mm [place] Band) 1/3/23, Dr. Mariam Acuna  Hx. Prostate cancer      Normal coronaries prior to minimally invasive Mi valve repair earlier this month. No hard statin indication at this time. Progressing well without concerns. Cardiac rehab referral.   Follow up echocardiogram.       Return in about 10 months (around 11/24/2023). Patient Instructions   Wean off Metoprolol;  Hold tonight  Take tomorrow morning  Then off completely    OK to stop Atorvastatin  Evaluate in ~5 years for indication to restart with screening Coronary CT Calcium Score    Continue Aspirin 81 mg    Echocardiogram in 3 months    Our office will write antibiotics prior to dental procedures and cleanings- call us for this    Referral:  Adonis Richardson  [address]  [place], [address] [facility]  [phone]      Orders Placed This Encounter   Procedures    [address]     Referral Priority:   Routine     Referral Type:   Eval and Treat     Referral Reason:   Specialty Services Required     Requested Specialty:   Cardiac Rehabilitation     Number of Visits Requested:   1    Echo 2D w doppler w color complete     Standing Status:   Future     Standing Expiration Date:   1/24/2024     Order Specific Question:   Reason for exam:     Answer:   NICM, s/p MV repair         Thank you for allowing me to participate in the care of your patient. Please do not hesitate to call. Thomas Greenwood DO, [facility] [place]  Interventional Cardiology     o: [phone]  [facility] [address], [address]      NOTE:  This report was transcribed using voice recognition software. Every effort was made to ensure accuracy; however, inadvertent computerized transcription errors may be present. Scribe's Attestation:  This note was scribed in the presence of Dr. Dolly Alvarenga, DO by Micah Urbano RN.    I, Flynn Oppenheim, have personally performed the services described in this documentation as scribed by Yarely Denton RN in my presence, and it is both accurate and complete. An electronic signature was used to authenticate this note.

## 2023-01-24 ENCOUNTER — OFFICE VISIT (OUTPATIENT)
Dept: CARDIOLOGY CLINIC | Age: 63
End: 2023-01-24
Payer: COMMERCIAL

## 2023-01-24 VITALS
BODY MASS INDEX: 25.24 KG/M2 | DIASTOLIC BLOOD PRESSURE: 64 MMHG | HEIGHT: 70 IN | HEART RATE: 91 BPM | OXYGEN SATURATION: 97 % | WEIGHT: 176.3 LBS | SYSTOLIC BLOOD PRESSURE: 116 MMHG

## 2023-01-24 DIAGNOSIS — I34.0 SEVERE MITRAL REGURGITATION: Primary | ICD-10-CM

## 2023-01-24 DIAGNOSIS — E78.2 MIXED HYPERLIPIDEMIA: ICD-10-CM

## 2023-01-24 DIAGNOSIS — I42.8 NONISCHEMIC CARDIOMYOPATHY (HCC): ICD-10-CM

## 2023-01-24 DIAGNOSIS — Z98.890 S/P MITRAL VALVE REPAIR: ICD-10-CM

## 2023-01-24 PROCEDURE — 99214 OFFICE O/P EST MOD 30 MIN: CPT | Performed by: INTERNAL MEDICINE

## 2023-01-24 RX ORDER — ASPIRIN 81 MG/1
81 TABLET, CHEWABLE ORAL DAILY
COMMUNITY
Start: 2023-01-06

## 2023-01-24 RX ORDER — ATORVASTATIN CALCIUM 10 MG/1
TABLET, FILM COATED ORAL
COMMUNITY
Start: 2023-01-06

## 2023-01-24 NOTE — PATIENT INSTRUCTIONS
Wean off Metoprolol;  Hold tonight  Take tomorrow morning  Then off completely    OK to stop Atorvastatin  Evaluate in ~5 years for indication to restart with screening Coronary CT Calcium Score    Continue Aspirin 81 mg    Echocardiogram in 3 months    Our office will write antibiotics prior to dental procedures and cleanings- call us for this    Referral:  Adonis Richardson  [address] [facility], [address] [phone]

## 2023-02-01 ENCOUNTER — HOSPITAL ENCOUNTER (OUTPATIENT)
Dept: CARDIAC REHAB | Age: 63
Setting detail: THERAPIES SERIES
Discharge: HOME | End: 2023-02-01
Payer: COMMERCIAL

## 2023-02-01 PROCEDURE — 93798 PHYS/QHP OP CAR RHAB W/ECG: CPT

## 2023-02-01 ASSESSMENT — PATIENT HEALTH QUESTIONNAIRE - PHQ9
4. FEELING TIRED OR HAVING LITTLE ENERGY: 1
SUM OF ALL RESPONSES TO PHQ QUESTIONS 1-9: 5
10. IF YOU CHECKED OFF ANY PROBLEMS, HOW DIFFICULT HAVE THESE PROBLEMS MADE IT FOR YOU TO DO YOUR WORK, TAKE CARE OF THINGS AT HOME, OR GET ALONG WITH OTHER PEOPLE: 0
SUM OF ALL RESPONSES TO PHQ QUESTIONS 1-9: 5
1. LITTLE INTEREST OR PLEASURE IN DOING THINGS: 0
SUM OF ALL RESPONSES TO PHQ QUESTIONS 1-9: 5
8. MOVING OR SPEAKING SO SLOWLY THAT OTHER PEOPLE COULD HAVE NOTICED. OR THE OPPOSITE, BEING SO FIGETY OR RESTLESS THAT YOU HAVE BEEN MOVING AROUND A LOT MORE THAN USUAL: 1
SUM OF ALL RESPONSES TO PHQ QUESTIONS 1-9: 5
2. FEELING DOWN, DEPRESSED OR HOPELESS: 1
9. THOUGHTS THAT YOU WOULD BE BETTER OFF DEAD, OR OF HURTING YOURSELF: 0
6. FEELING BAD ABOUT YOURSELF - OR THAT YOU ARE A FAILURE OR HAVE LET YOURSELF OR YOUR FAMILY DOWN: 0
3. TROUBLE FALLING OR STAYING ASLEEP: 1
5. POOR APPETITE OR OVEREATING: 0
SUM OF ALL RESPONSES TO PHQ9 QUESTIONS 1 & 2: 1
7. TROUBLE CONCENTRATING ON THINGS, SUCH AS READING THE NEWSPAPER OR WATCHING TELEVISION: 1
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
## 2023-02-03 ENCOUNTER — HOSPITAL ENCOUNTER (OUTPATIENT)
Dept: CARDIAC REHAB | Age: 63
Setting detail: THERAPIES SERIES
Discharge: HOME | End: 2023-02-03
Payer: COMMERCIAL

## 2023-02-03 PROCEDURE — 93798 PHYS/QHP OP CAR RHAB W/ECG: CPT

## 2023-02-06 ENCOUNTER — HOSPITAL ENCOUNTER (OUTPATIENT)
Dept: CARDIAC REHAB | Age: 63
Setting detail: THERAPIES SERIES
Discharge: HOME | End: 2023-02-06
Payer: COMMERCIAL

## 2023-02-06 PROCEDURE — 93798 PHYS/QHP OP CAR RHAB W/ECG: CPT

## 2023-02-08 ENCOUNTER — HOSPITAL ENCOUNTER (OUTPATIENT)
Dept: CARDIAC REHAB | Age: 63
Setting detail: THERAPIES SERIES
Discharge: HOME | End: 2023-02-08
Payer: COMMERCIAL

## 2023-02-08 PROCEDURE — 93798 PHYS/QHP OP CAR RHAB W/ECG: CPT

## 2023-02-10 ENCOUNTER — HOSPITAL ENCOUNTER (OUTPATIENT)
Dept: CARDIAC REHAB | Age: 63
Setting detail: THERAPIES SERIES
Discharge: HOME | End: 2023-02-10
Payer: COMMERCIAL

## 2023-02-10 PROCEDURE — 93798 PHYS/QHP OP CAR RHAB W/ECG: CPT

## 2023-02-13 ENCOUNTER — HOSPITAL ENCOUNTER (OUTPATIENT)
Dept: CARDIAC REHAB | Age: 63
Setting detail: THERAPIES SERIES
Discharge: HOME | End: 2023-02-13
Payer: COMMERCIAL

## 2023-02-13 PROCEDURE — 93798 PHYS/QHP OP CAR RHAB W/ECG: CPT

## 2023-02-15 ENCOUNTER — HOSPITAL ENCOUNTER (OUTPATIENT)
Dept: CARDIAC REHAB | Age: 63
Setting detail: THERAPIES SERIES
Discharge: HOME | End: 2023-02-15
Payer: COMMERCIAL

## 2023-02-15 PROCEDURE — 93798 PHYS/QHP OP CAR RHAB W/ECG: CPT

## 2023-02-17 ENCOUNTER — HOSPITAL ENCOUNTER (OUTPATIENT)
Dept: CARDIAC REHAB | Age: 63
Setting detail: THERAPIES SERIES
Discharge: HOME | End: 2023-02-17
Payer: COMMERCIAL

## 2023-02-17 PROCEDURE — 93798 PHYS/QHP OP CAR RHAB W/ECG: CPT

## 2023-02-20 ENCOUNTER — HOSPITAL ENCOUNTER (OUTPATIENT)
Dept: CARDIAC REHAB | Age: 63
Setting detail: THERAPIES SERIES
Discharge: HOME | End: 2023-02-20
Payer: COMMERCIAL

## 2023-02-20 PROCEDURE — 93798 PHYS/QHP OP CAR RHAB W/ECG: CPT

## 2023-02-22 ENCOUNTER — HOSPITAL ENCOUNTER (OUTPATIENT)
Dept: CARDIAC REHAB | Age: 63
Setting detail: THERAPIES SERIES
Discharge: HOME | End: 2023-02-22
Payer: COMMERCIAL

## 2023-02-22 PROCEDURE — 93798 PHYS/QHP OP CAR RHAB W/ECG: CPT

## 2023-02-24 ENCOUNTER — HOSPITAL ENCOUNTER (OUTPATIENT)
Dept: CARDIAC REHAB | Age: 63
Setting detail: THERAPIES SERIES
Discharge: HOME | End: 2023-02-24
Payer: COMMERCIAL

## 2023-02-24 PROCEDURE — 93798 PHYS/QHP OP CAR RHAB W/ECG: CPT

## 2023-02-27 ENCOUNTER — HOSPITAL ENCOUNTER (OUTPATIENT)
Dept: CARDIAC REHAB | Age: 63
Setting detail: THERAPIES SERIES
Discharge: HOME | End: 2023-02-27
Payer: COMMERCIAL

## 2023-02-27 PROCEDURE — 93798 PHYS/QHP OP CAR RHAB W/ECG: CPT

## 2023-03-01 ENCOUNTER — HOSPITAL ENCOUNTER (OUTPATIENT)
Dept: CARDIAC REHAB | Age: 63
Setting detail: THERAPIES SERIES
Discharge: HOME | End: 2023-03-01
Payer: COMMERCIAL

## 2023-03-01 PROCEDURE — 93798 PHYS/QHP OP CAR RHAB W/ECG: CPT

## 2023-03-03 ENCOUNTER — HOSPITAL ENCOUNTER (OUTPATIENT)
Dept: CARDIAC REHAB | Age: 63
Setting detail: THERAPIES SERIES
Discharge: HOME | End: 2023-03-03
Payer: COMMERCIAL

## 2023-03-03 PROCEDURE — 93798 PHYS/QHP OP CAR RHAB W/ECG: CPT

## 2023-03-06 ENCOUNTER — HOSPITAL ENCOUNTER (OUTPATIENT)
Dept: CARDIAC REHAB | Age: 63
Setting detail: THERAPIES SERIES
Discharge: HOME | End: 2023-03-06
Payer: COMMERCIAL

## 2023-03-06 PROCEDURE — 93798 PHYS/QHP OP CAR RHAB W/ECG: CPT

## 2023-03-08 ENCOUNTER — HOSPITAL ENCOUNTER (OUTPATIENT)
Dept: CARDIAC REHAB | Age: 63
Setting detail: THERAPIES SERIES
Discharge: HOME | End: 2023-03-08
Payer: COMMERCIAL

## 2023-03-08 PROCEDURE — 93798 PHYS/QHP OP CAR RHAB W/ECG: CPT

## 2023-03-10 ENCOUNTER — HOSPITAL ENCOUNTER (OUTPATIENT)
Dept: CARDIAC REHAB | Age: 63
Setting detail: THERAPIES SERIES
Discharge: HOME | End: 2023-03-10
Payer: COMMERCIAL

## 2023-03-10 PROCEDURE — 93798 PHYS/QHP OP CAR RHAB W/ECG: CPT

## 2023-03-13 ENCOUNTER — HOSPITAL ENCOUNTER (OUTPATIENT)
Dept: CARDIAC REHAB | Age: 63
Setting detail: THERAPIES SERIES
Discharge: HOME | End: 2023-03-13
Payer: COMMERCIAL

## 2023-03-13 PROCEDURE — 93798 PHYS/QHP OP CAR RHAB W/ECG: CPT

## 2023-03-15 ENCOUNTER — HOSPITAL ENCOUNTER (OUTPATIENT)
Dept: CARDIAC REHAB | Age: 63
Setting detail: THERAPIES SERIES
Discharge: HOME | End: 2023-03-15
Payer: COMMERCIAL

## 2023-03-15 PROCEDURE — 93798 PHYS/QHP OP CAR RHAB W/ECG: CPT

## 2023-03-17 ENCOUNTER — HOSPITAL ENCOUNTER (OUTPATIENT)
Dept: CARDIAC REHAB | Age: 63
Setting detail: THERAPIES SERIES
Discharge: HOME | End: 2023-03-17
Payer: COMMERCIAL

## 2023-03-17 PROCEDURE — 93798 PHYS/QHP OP CAR RHAB W/ECG: CPT

## 2023-03-20 ENCOUNTER — HOSPITAL ENCOUNTER (OUTPATIENT)
Dept: CARDIAC REHAB | Age: 63
Setting detail: THERAPIES SERIES
Discharge: HOME | End: 2023-03-20
Payer: COMMERCIAL

## 2023-03-20 PROCEDURE — 93798 PHYS/QHP OP CAR RHAB W/ECG: CPT

## 2023-03-22 ENCOUNTER — APPOINTMENT (OUTPATIENT)
Dept: CARDIAC REHAB | Age: 63
End: 2023-03-22
Payer: COMMERCIAL

## 2023-03-24 ENCOUNTER — APPOINTMENT (OUTPATIENT)
Dept: CARDIAC REHAB | Age: 63
End: 2023-03-24
Payer: COMMERCIAL

## 2023-03-27 ENCOUNTER — HOSPITAL ENCOUNTER (OUTPATIENT)
Dept: CARDIAC REHAB | Age: 63
Setting detail: THERAPIES SERIES
Discharge: HOME | End: 2023-03-27
Payer: COMMERCIAL

## 2023-03-27 PROCEDURE — 93798 PHYS/QHP OP CAR RHAB W/ECG: CPT

## 2023-03-29 ENCOUNTER — HOSPITAL ENCOUNTER (OUTPATIENT)
Dept: CARDIAC REHAB | Age: 63
Setting detail: THERAPIES SERIES
Discharge: HOME | End: 2023-03-29
Payer: COMMERCIAL

## 2023-03-29 PROCEDURE — 93798 PHYS/QHP OP CAR RHAB W/ECG: CPT

## 2023-03-31 ENCOUNTER — HOSPITAL ENCOUNTER (OUTPATIENT)
Dept: CARDIAC REHAB | Age: 63
Setting detail: THERAPIES SERIES
Discharge: HOME | End: 2023-03-31
Payer: COMMERCIAL

## 2023-03-31 PROCEDURE — 93798 PHYS/QHP OP CAR RHAB W/ECG: CPT

## 2023-05-01 ENCOUNTER — PATIENT MESSAGE (OUTPATIENT)
Dept: CARDIOLOGY CLINIC | Age: 63
End: 2023-05-01

## 2023-05-01 ENCOUNTER — HOSPITAL ENCOUNTER (OUTPATIENT)
Dept: NON INVASIVE DIAGNOSTICS | Age: 63
Discharge: HOME | End: 2023-05-01
Payer: COMMERCIAL

## 2023-05-01 DIAGNOSIS — I34.0 SEVERE MITRAL REGURGITATION: ICD-10-CM

## 2023-05-01 DIAGNOSIS — I42.8 NONISCHEMIC CARDIOMYOPATHY (HCC): ICD-10-CM

## 2023-05-01 DIAGNOSIS — Z98.890 S/P MITRAL VALVE REPAIR: ICD-10-CM

## 2023-05-01 LAB
LV EF: 58 %
LVEF MODALITY: NORMAL

## 2023-05-01 PROCEDURE — 93306 TTE W/DOPPLER COMPLETE: CPT

## 2023-05-02 NOTE — TELEPHONE ENCOUNTER
Pt called back stating he was returning a call to [address] [place] [address] will be waiting for RN to call him back.

## 2023-05-02 NOTE — TELEPHONE ENCOUNTER
Attempted to call [facility] regarding [employer], no answer lvm for callback

## 2023-05-02 NOTE — TELEPHONE ENCOUNTER
From: Ena Chappell  To: Dr. Gaila Bence: 5/1/2023 7:22 PM EDT  Subject: echo results    I saw my EF is back to presugery #. Still have some fatique and I noticed since surgery I can get lightheaded if I stand up after squatting or being on the ground for a few minutes. I noticed the test said I have mild to moderate pulmonary regurgitation. Should I be concerned? My next appointment is mid Oct. Should I schedule something sooner? Thanks!

## 2023-05-03 NOTE — TELEPHONE ENCOUNTER
Spoke with Ben Carmona regarding echo results and answered all questions about lightheadedness with standing. We discussed hydration at length. He will call back if increased hydration does not help with dizziness. I did also discuss CASE workup- he states a while back he did a home sleep test that came back negative for CASE; offered to consider seeing Sleep Doctor to update test.  [facility] aware and in agreement.

## 2023-05-05 ENCOUNTER — PATIENT MESSAGE (OUTPATIENT)
Dept: CARDIOLOGY CLINIC | Age: 63
End: 2023-05-05

## 2023-05-12 NOTE — TELEPHONE ENCOUNTER
Dentist office told Mr. Jolly Res they would just look at the tooth  I advised to his that it is recommended to wait the full 6 mo if possible and tooth is not anything urgent that needs to be addressed  If he needs anything and in the long term, need abx prior  He verbalized understanding and will call me if he needs to go back for any kind of intervention on his tooth

## 2023-07-25 ENCOUNTER — OFFICE VISIT (OUTPATIENT)
Dept: ORTHOPEDIC SURGERY | Age: 63
End: 2023-07-25
Payer: COMMERCIAL

## 2023-07-25 DIAGNOSIS — S76.012A STRAIN OF LEFT HIP ADDUCTOR MUSCLE, INITIAL ENCOUNTER: ICD-10-CM

## 2023-07-25 DIAGNOSIS — M25.552 PAIN OF LEFT HIP: Primary | ICD-10-CM

## 2023-07-25 PROCEDURE — 99202 OFFICE O/P NEW SF 15 MIN: CPT | Performed by: PHYSICIAN ASSISTANT

## 2023-07-25 NOTE — PROGRESS NOTES
possible that there are still dictated errors within this office note. If so, please bring any significant errors to my attention for an addendum. All efforts were made to ensure that this office note is accurate.

## 2023-07-31 ENCOUNTER — OFFICE VISIT (OUTPATIENT)
Dept: ORTHOPEDIC SURGERY | Age: 63
End: 2023-07-31
Payer: COMMERCIAL

## 2023-07-31 VITALS — BODY MASS INDEX: 25.05 KG/M2 | WEIGHT: 175 LBS | HEIGHT: 70 IN

## 2023-07-31 DIAGNOSIS — S76.202A INJURY OF ADDUCTOR MUSCLE AND TENDON OF LEFT THIGH, INITIAL ENCOUNTER: Primary | ICD-10-CM

## 2023-07-31 PROCEDURE — 99204 OFFICE O/P NEW MOD 45 MIN: CPT | Performed by: ORTHOPAEDIC SURGERY

## 2023-07-31 NOTE — PROGRESS NOTES
Date:  2023    Name:  Shanthi Stevens  Address:  [address] [address]    :  1960      Age:   58 y.o.    SSN:  [id number]      Medical Record Number:  [id number]    Reason for Visit:    Chief Complaint    Hip Pain (Left hip/)      DOS:2023     HPI: Janes Hobson is a 58 y.o. male here today for  evaluation of left  anterior hip and adductor pain that has been on going for 2 week(s). Patient was water skiing when he was forced into forced hyperextension of his right hip. 2 days later he noted severe ecchymosis on his medial thigh and groin which prompted evaluation at our after hours clinic. Since then, his symptoms and bruising have improved. He denies pain at today's visit. He is still having some difficulty with getting in a car. Pain assessment is documented below. The patient denies any bowel/bladder symptoms, or any numbness, tingling, or weakness down the affected thigh or leg. The patient denies any prior hip injuries, surgeries, or any childhood history of hip disorders. Pain Assessment  Location of Pain: Hip  Location Modifiers: Left  Severity of Pain: 2  Quality of Pain: Aching, Dull, Sharp  Frequency of Pain: Intermittent  Aggravating Factors: Other (Comment) (moving leg sideways)  Limiting Behavior: Yes  Relieving Factors: Rest  Result of Injury: Yes  Work-Related Injury: No  ROS: Review of systems reviewed from Patient History Form completed today and available in the patient's chart under the Media tab.        Past Medical History:   Diagnosis Date    Cancer ([address]) 2019    Prostate    Hyperlipidemia         Past Surgical History:   Procedure Laterality Date    HERNIA REPAIR   and     HOME SLEEP STUDY  2020         MITRAL VALVE REPAIR  2023    TONSILLECTOMY         Family History   Problem Relation Age of Onset    Hypertension Mother     Uterine Cancer Mother     Thyroid Cancer Sister         breast    Stroke Maternal Grandmother

## 2023-08-09 ENCOUNTER — HOSPITAL ENCOUNTER (OUTPATIENT)
Dept: PHYSICAL THERAPY | Age: 63
Setting detail: THERAPIES SERIES
Discharge: HOME | End: 2023-08-09
Payer: COMMERCIAL

## 2023-08-09 PROCEDURE — 97110 THERAPEUTIC EXERCISES: CPT

## 2023-08-09 PROCEDURE — 97530 THERAPEUTIC ACTIVITIES: CPT

## 2023-08-09 PROCEDURE — 97161 PT EVAL LOW COMPLEX 20 MIN: CPT

## 2023-08-09 NOTE — FLOWSHEET NOTE
self-care, transfers, ambulation, and ascending / descending stairs. Charges:  Timed Code Treatment Minutes: 10   Total Treatment Minutes: 38     [x] EVAL - LOW (27726)   [] EVAL - MOD (80606)  [] EVAL - HIGH (39617)  [] RE-EVAL (02157)  [x] SZ(06932) x       [] Ionto  [] NMR (81527) x       [] Vaso  [] Manual (76072) x       [] Ultrasound  [x] TA x        [] Mech Traction (39297)  [] Aquatic Therapy x     [] ES (un) (21134):   [] Home Management Training x  [] ES(attended) (70124)   [] Dry Needling 1-2 muscles (96518):  [] Dry Needling 3+ muscles (448740  [] Group:      [] Other:   GOALS:  Patient stated goal: \"strengthen core, regain full movement\"  [] Progressing: [] Met: [] Not Met: [] Adjusted    Therapist goals for Patient:   Short Term Goals: To be achieved in: 2 weeks  1. Independent in HEP and progression per patient tolerance, in order to prevent re-injury. [] Progressing: [] Met: [] Not Met: [] Adjusted  2. Patient will have a decrease in pain to facilitate improvement in movement, function, and ADLs as indicated by Functional Deficits. [] Progressing: [] Met: [] Not Met: [] Adjusted    Long Term Goals: To be achieved in: 4 weeks  1. Pt will improve LEFS by 9 points to reduce disability and progress towards PLOF. [] Progressing: [] Met: [] Not Met: [] Adjusted  2. Patient will demonstrate increased L hip AROM to symmetrical w/ RLE to allow for proper joint functioning as indicated by patients Functional Deficits. [] Progressing: [] Met: [] Not Met: [] Adjusted  3. Patient will demonstrate an increase in gross LE Strength to at least 5/5 as well as good proximal hip strength and control to allow for proper functional mobility as indicated by patients Functional Deficits. [] Progressing: [] Met: [] Not Met: [] Adjusted  4. Patient will return to functional activities including sup<>sit and car transfers without increased symptoms or restriction.    [] Progressing: [] Met: [] Not Met: []

## 2023-08-18 RX ORDER — AMOXICILLIN 500 MG/1
2000 CAPSULE ORAL ONCE
Qty: 4 CAPSULE | Refills: 0 | Status: SHIPPED | OUTPATIENT
Start: 2023-08-18 | End: 2023-08-18

## 2023-08-24 ENCOUNTER — TELEPHONE (OUTPATIENT)
Dept: CARDIOLOGY CLINIC | Age: 63
End: 2023-08-24

## 2023-08-24 NOTE — TELEPHONE ENCOUNTER
Appt letter has been mailed to the pt asking the to call the office to let our office know if they prefer to go to [id number] [facility] or stay at [facility]

## 2023-08-28 NOTE — TELEPHONE ENCOUNTER
Pt called back and is RS with [facility] 10/18 [id number] Ankur Mendez. Encounter complete

## 2023-09-11 ENCOUNTER — OFFICE VISIT (OUTPATIENT)
Dept: ORTHOPEDIC SURGERY | Age: 63
End: 2023-09-11
Payer: COMMERCIAL

## 2023-09-11 VITALS — WEIGHT: 175 LBS | HEIGHT: 70 IN | BODY MASS INDEX: 25.05 KG/M2

## 2023-09-11 DIAGNOSIS — S76.202D INJURY OF ADDUCTOR MUSCLE AND TENDON OF LEFT THIGH, SUBSEQUENT ENCOUNTER: Primary | ICD-10-CM

## 2023-09-11 PROCEDURE — 99213 OFFICE O/P EST LOW 20 MIN: CPT | Performed by: ORTHOPAEDIC SURGERY

## 2023-10-13 NOTE — PATIENT INSTRUCTIONS
Stay well hydrated. It is recommended that your drink 8-10 (8 oz glasses)  of water daily. Increasing intake on hot days 10-15 (8 oz glasses) of water daily. At the very minimum take at least 64 oz of water daily. Ok to stop Metoprolol and atorvastatin. We will check your cholesterol  in December. Follow up in one year.

## 2023-10-13 NOTE — PROGRESS NOTES
10/18/2023    PATIENT: Héctor Shearer  : 1960    Primary Care Provider:   Zhanna Olivarez MD  P:[phone]  A:[phone]    Reason for evaluation:   Chief Complaint   Patient presents with    Follow-up     History of present illness:   Mr. Héctor Shearer is a 58 y.o. male patient here in routine CV follow up regarding mitral valve repair with Dr. Curly De La Vega earlier this year. His wife is a retired registered nurse and here with him today. She feels he is not drinking enough water during the day, concern for orthostasis. He had described to her over the summer multiple times working in the yard with lightheadedness following \"crouching down for work and standing back up\". No other instances of lightheadedness or dizziness. States he drinks approximately 24 ounces of water, 20 ounces of coffee per day, and an occasional beer. No palpitations, shortness of breath, edema.       Medical History:      Diagnosis Date    Cancer ([facility]) 2019    Prostate    Hyperlipidemia        Surgical History:      Procedure Laterality Date    HERNIA REPAIR   and     HOME SLEEP STUDY  2020         MITRAL VALVE REPAIR  2023    TONSILLECTOMY         Social History:  Social History     Socioeconomic History    Marital status:      Spouse name: Ney Perkins    Number of children: 3    Years of education: 20    Highest education level: Not on file   Occupational History    Occupation:      Comment: [employer]   Tobacco Use    Smoking status: Never    Smokeless tobacco: Never   Vaping Use    Vaping Use: Never used   Substance and Sexual Activity    Alcohol use: Yes     Comment: 2-7 beer, wine, or liquor    Drug use: Never    Sexual activity: Not on file   Other Topics Concern    Not on file   Social History Narrative    Not on file     Social Determinants of Health     Financial Resource Strain: Not on file   Food Insecurity: Not on
stroke  volume is 77ml. The stroke index is 39ml/m^2. 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 The  valve area (LVOT continuity) is 1.7cm^2.  - Right ventricle: The cavity size is normal. Wall thickness is  normal. Systolic function is normal.  - Right atrium: The atrium is normal in size. - Inferior vena cava: The vessel was normal in size; the  respirophasic diameter changes were in the normal range (&gt;= 50%);  findings are consistent with normal central venous pressure. TTE 5/1/23   Summary   Left ventricular cavity size is normal. Overall left ventricular systolic   function appears normal. Ejection fraction is visually estimated to be   55-60%. E/e'= 12.5   No regional wall motion abnormalities are noted. Normal diastolic function. A mitral valve repair is noted with a peak velocity of 121 m/s and a mean   gradient of 3 mmHg. Mild mitral regurgitation. The mitral leaflets appear myxomatous. The aortic valve is structurally normal.   No evidence of aortic valve regurgitation. The aortic valve appears tricuspid . The right ventricle is normal in size and function. TAPSE= 2.00 RV S'= 11.1   Mild tricuspid regurgitation. Systolic pulmonary artery pressure (SPAP) estimated at 21 mmHg (RA pressure   3 mmHg),   Mild to moderate pulmonic regurgitation present. There is a small clear space seen anteriorly. There is no evidence of right atrial or right ventricular collapse. IVC size is normal (<2.1cm) and collapses > 50% with respiration consistent   with normal RA pressure (3mmHg). Impression/Recommendations    Mr. Clementine Steward is a 58 y.o. male patient with:    Mitral insufficiency s/p robotic MV repair (32 mm Evergreen Band) 1/3/23, Dr. Jeri Hernandez  Hyperlipidemia, untreated (12/2022:  TG 94 HDL 69 )  Hx. Prostate cancer      ***  Echo may 2023   No other changes        No follow-ups on file.   Patient Instructions   Mitral insufficiency s/p robotic MV

## 2023-10-18 ENCOUNTER — OFFICE VISIT (OUTPATIENT)
Dept: CARDIOLOGY CLINIC | Age: 63
End: 2023-10-18
Payer: COMMERCIAL

## 2023-10-18 VITALS
HEIGHT: 70 IN | HEART RATE: 75 BPM | SYSTOLIC BLOOD PRESSURE: 132 MMHG | DIASTOLIC BLOOD PRESSURE: 72 MMHG | OXYGEN SATURATION: 99 % | WEIGHT: 175 LBS | BODY MASS INDEX: 25.05 KG/M2

## 2023-10-18 DIAGNOSIS — E78.2 MIXED HYPERLIPIDEMIA: Primary | ICD-10-CM

## 2023-10-18 DIAGNOSIS — I34.0 SEVERE MITRAL REGURGITATION: ICD-10-CM

## 2023-10-18 DIAGNOSIS — Z98.890 S/P MITRAL VALVE REPAIR: ICD-10-CM

## 2023-10-18 PROCEDURE — 99214 OFFICE O/P EST MOD 30 MIN: CPT | Performed by: INTERNAL MEDICINE

## 2023-11-15 ENCOUNTER — OFFICE VISIT (OUTPATIENT)
Dept: ORTHOPEDIC SURGERY | Age: 63
End: 2023-11-15
Payer: COMMERCIAL

## 2023-11-15 VITALS — WEIGHT: 175 LBS | BODY MASS INDEX: 25.05 KG/M2 | RESPIRATION RATE: 12 BRPM | HEIGHT: 70 IN

## 2023-11-15 DIAGNOSIS — M25.511 ACUTE PAIN OF RIGHT SHOULDER: Primary | ICD-10-CM

## 2023-11-15 DIAGNOSIS — M75.81 ROTATOR CUFF TENDINITIS, RIGHT: ICD-10-CM

## 2023-11-15 PROCEDURE — 99213 OFFICE O/P EST LOW 20 MIN: CPT | Performed by: PHYSICIAN ASSISTANT

## 2023-11-15 NOTE — PROGRESS NOTES
Subjective:      Patient ID: Jason Ulrich is a 58 y.o. male who presents to the [address] [place] [address] for chief complaint of right shoulder discomfort. HPI:   He states several weeks ago he was working in the yard with a [employer]. He was swinging overhead for about an hour. The following day he began to have discomfort in the right shoulder which progressively worsened. He started to do some exercises which he was given a years ago for a shoulder rehab program.  Today he states that the shoulder is feeling as good as it has since the onset of pain. Pain Scale 2/10 VAS. Location of pain deltoid region of the right shoulder. Pain is worse with use of the right arm. Pain improves with rest.   Previous treatments have included occasional Tylenol. Review of Systems:  I have reviewed the clinically relevant past medical history, medications, allergies, family history, social history, and 13 point Review of Systems from the patient's recent history form & documented any details relevant to today's presenting complaints in the history above. The patient's self-reported past medical history, medications, allergies, family history, social history, and Review of Systems form from today and has been scanned into the chart under the \"Media\" tab. Constitutional: denies fever, chills, weight loss. MSK: denies pain in other joints, muscle aches. Neurological: denies numbness, tingling, weakness.        Past Medical History:   Diagnosis Date    Cancer ([address]) 11/2019    Prostate    Hyperlipidemia        Family History   Problem Relation Age of Onset    Hypertension Mother     Uterine Cancer Mother     Thyroid Cancer Sister         breast    Stroke Maternal Grandmother     Cancer Brother         coln       Past Surgical History:   Procedure Laterality Date    HERNIA REPAIR  '90 and '95    HOME SLEEP STUDY  06/01/2020         MITRAL VALVE REPAIR  01/03/2023    TONSILLECTOMY         Social

## 2023-11-28 ENCOUNTER — OFFICE VISIT (OUTPATIENT)
Dept: ORTHOPEDIC SURGERY | Age: 63
End: 2023-11-28
Payer: COMMERCIAL

## 2023-11-28 VITALS — WEIGHT: 175 LBS | HEIGHT: 70 IN | BODY MASS INDEX: 25.05 KG/M2

## 2023-11-28 DIAGNOSIS — S82.62XA CLOSED DISPLACED FRACTURE OF LATERAL MALLEOLUS OF LEFT FIBULA, INITIAL ENCOUNTER: ICD-10-CM

## 2023-11-28 DIAGNOSIS — S93.401A SPRAIN OF RIGHT ANKLE, UNSPECIFIED LIGAMENT, INITIAL ENCOUNTER: Primary | ICD-10-CM

## 2023-11-28 PROCEDURE — L4361 PNEUMA/VAC WALK BOOT PRE OTS: HCPCS | Performed by: NURSE PRACTITIONER

## 2023-11-28 PROCEDURE — E0114 CRUTCH UNDERARM PAIR NO WOOD: HCPCS | Performed by: NURSE PRACTITIONER

## 2023-11-28 PROCEDURE — 99214 OFFICE O/P EST MOD 30 MIN: CPT | Performed by: NURSE PRACTITIONER

## 2023-11-28 NOTE — PROGRESS NOTES
Name_______________________________________Printed:____________________  Date and time of surgery_______11/30/23 0745_________________Arrival Time:____[facility]____________   1. The instructions given regarding when and if a patient needs to stop oral intake prior to surgery varies. Follow the specific instructions you were given                  _x__Nothing to eat or to drink after Midnight the night before.                   ____Carbo loading or instructions will be given to select patients-if you have been given those instructions -please do the following                           The evening before your surgery after dinner before midnight drink 40 ounces of gatorade. If you are diabetic use sugar free. The morning of surgery drink 40 ounces of water. This needs to be finished 3 hours prior to your surgery start time. 2. Take the following pills with a small sip of water on the morning of surgery___________________________________________________                  Do not take blood pressure medications ending in pril or sartan the serafin prior to surgery or the morning of surgery. Dr Cortes Jacob patient are not to take any medications the AM of surgery. 3. Aspirin, Ibuprofen, Advil, Naproxen, Vitamin E and other Anti-inflammatory products and supplements should be stopped for 5 -7days before surgery or as directed by your physician. 4. Check with your Doctor regarding stopping Plavix, Coumadin,Eliquis, Lovenox,Effient,Pradaxa,Xarelto, Fragmin or other blood thinners and follow their instructions. 5. Do not smoke, and do not drink any alcoholic beverages 24 hours prior to surgery. This includes NA Beer. Refrain from the usage of any recreational drugs. 6. You may brush your teeth and gargle the morning of surgery. DO NOT SWALLOW WATER   7. You MUST make arrangements for a responsible adult to stay on site while you are here and take you home after your surgery.  You will not be allowed to leave alone or and insurance card. 19.  Visit our web site for additional information:  [facility]/patient-eprep              20.During flu season no children under the age of 15 are permitted in the hospital for the safety of all patients. 21. If you take a long acting insulin in the evening only  take half of your usual  dose the night  before your procedure              22. If you use a c-pap please bring DOS if staying overnight,             23.For your convenience [facility] has a pharmacy on site to fill your prescriptions. 24. If you use oxygen and have a portable tank please bring it  with you the DOS             25. Bring a complete list of all your medications with name and dose include any supplements. 26. Other__________________________________________   *Please call pre admission testing if you any further questions   Blaise Del Real         [address]   [address] [facility] [place]. [place]  [phone]   [address] [place] [address]       VISITOR POLICY(subject to change)    Current policy is 2 visitors per patient. No children. Mask is  at the discretion of the facility. Visiting hours are 8a-8p. Overnight visitors will be at the discretion of the nurse. All policies subject to change. All above information reviewed with patient in person or by phone. Patient verbalizes understanding. All questions and concerns addressed.                                                                                                  Patient/Rep________pt____________                                                                                                                                    PRE OP INSTRUCTIONS

## 2023-11-28 NOTE — PROGRESS NOTES
to contact the office immediately should the brace result in increased pain, decreased sensation, increased swelling or worsening of the condition. Aluminum Crutches     Patient was prescribed Medline Aluminum Crutches. This mobility device is required for the following reasons:    Patient has mobility limitations that significantly impair their ability to participate in one or more mobility related activities of daily living. The patient is able to safely use the mobility device. Functional mobility deficit can be sufficiently resolved with the use of this device. Verbal and written instructions for the use of and application of this item were provided. The patient was educated and fit by a healthcare professional with expert knowledge and specialization in brace application while under the direct supervision of the treating physician. They were instructed to contact the office immediately should the equipment result in increased pain, decreased sensation, increased swelling or worsening of the condition.      Michelle Gu, APRN - CNP

## 2023-11-29 ENCOUNTER — TELEPHONE (OUTPATIENT)
Dept: ORTHOPEDIC SURGERY | Age: 63
End: 2023-11-29

## 2023-11-29 NOTE — TELEPHONE ENCOUNTER
Auth: NPR  Date: 11/30/23 thru 02/29/24  Reference # [id number]  Spoke with: Online  Type of SX: Outpatient  Location: [facility]  CPT: 74877   DX: [phone]  SX area: Lt ankle  Insurance: [employer]

## 2023-11-30 ENCOUNTER — ANESTHESIA EVENT (OUTPATIENT)
Dept: OPERATING ROOM | Age: 63
End: 2023-11-30
Payer: COMMERCIAL

## 2023-11-30 ENCOUNTER — HOSPITAL ENCOUNTER (OUTPATIENT)
Age: 63
Setting detail: OUTPATIENT SURGERY
Discharge: HOME | End: 2023-11-30
Attending: ORTHOPAEDIC SURGERY | Admitting: ORTHOPAEDIC SURGERY
Payer: COMMERCIAL

## 2023-11-30 ENCOUNTER — ANESTHESIA (OUTPATIENT)
Dept: OPERATING ROOM | Age: 63
End: 2023-11-30
Payer: COMMERCIAL

## 2023-11-30 ENCOUNTER — APPOINTMENT (OUTPATIENT)
Dept: GENERAL RADIOLOGY | Age: 63
End: 2023-11-30
Attending: ORTHOPAEDIC SURGERY
Payer: COMMERCIAL

## 2023-11-30 VITALS
OXYGEN SATURATION: 100 % | RESPIRATION RATE: 13 BRPM | TEMPERATURE: 97.6 F | WEIGHT: 175 LBS | HEIGHT: 70 IN | DIASTOLIC BLOOD PRESSURE: 71 MMHG | SYSTOLIC BLOOD PRESSURE: 114 MMHG | BODY MASS INDEX: 25.05 KG/M2 | HEART RATE: 71 BPM

## 2023-11-30 DIAGNOSIS — S82.62XA CLOSED DISPLACED FRACTURE OF LATERAL MALLEOLUS OF LEFT FIBULA, INITIAL ENCOUNTER: Primary | ICD-10-CM

## 2023-11-30 PROBLEM — S93.432A SYNDESMOTIC DISRUPTION OF LEFT ANKLE: Status: ACTIVE | Noted: 2023-11-30

## 2023-11-30 PROCEDURE — 2500000003 HC RX 250 WO HCPCS: Performed by: NURSE ANESTHETIST, CERTIFIED REGISTERED

## 2023-11-30 PROCEDURE — 2580000003 HC RX 258: Performed by: ORTHOPAEDIC SURGERY

## 2023-11-30 PROCEDURE — 6370000000 HC RX 637 (ALT 250 FOR IP): Performed by: ANESTHESIOLOGY

## 2023-11-30 PROCEDURE — 3700000000 HC ANESTHESIA ATTENDED CARE: Performed by: ORTHOPAEDIC SURGERY

## 2023-11-30 PROCEDURE — 7100000001 HC PACU RECOVERY - ADDTL 15 MIN: Performed by: ORTHOPAEDIC SURGERY

## 2023-11-30 PROCEDURE — 27829 TREAT LOWER LEG JOINT: CPT | Performed by: NURSE PRACTITIONER

## 2023-11-30 PROCEDURE — 7100000010 HC PHASE II RECOVERY - FIRST 15 MIN: Performed by: ORTHOPAEDIC SURGERY

## 2023-11-30 PROCEDURE — 3600000004 HC SURGERY LEVEL 4 BASE: Performed by: ORTHOPAEDIC SURGERY

## 2023-11-30 PROCEDURE — 6360000002 HC RX W HCPCS: Performed by: NURSE ANESTHETIST, CERTIFIED REGISTERED

## 2023-11-30 PROCEDURE — 3600000014 HC SURGERY LEVEL 4 ADDTL 15MIN: Performed by: ORTHOPAEDIC SURGERY

## 2023-11-30 PROCEDURE — 6360000002 HC RX W HCPCS: Performed by: ORTHOPAEDIC SURGERY

## 2023-11-30 PROCEDURE — 7100000000 HC PACU RECOVERY - FIRST 15 MIN: Performed by: ORTHOPAEDIC SURGERY

## 2023-11-30 PROCEDURE — A4217 STERILE WATER/SALINE, 500 ML: HCPCS | Performed by: ORTHOPAEDIC SURGERY

## 2023-11-30 PROCEDURE — 27792 TREATMENT OF ANKLE FRACTURE: CPT | Performed by: ORTHOPAEDIC SURGERY

## 2023-11-30 PROCEDURE — C1713 ANCHOR/SCREW BN/BN,TIS/BN: HCPCS | Performed by: ORTHOPAEDIC SURGERY

## 2023-11-30 PROCEDURE — 3700000001 HC ADD 15 MINUTES (ANESTHESIA): Performed by: ORTHOPAEDIC SURGERY

## 2023-11-30 PROCEDURE — 7100000011 HC PHASE II RECOVERY - ADDTL 15 MIN: Performed by: ORTHOPAEDIC SURGERY

## 2023-11-30 PROCEDURE — 73600 X-RAY EXAM OF ANKLE: CPT

## 2023-11-30 PROCEDURE — 2709999900 HC NON-CHARGEABLE SUPPLY: Performed by: ORTHOPAEDIC SURGERY

## 2023-11-30 PROCEDURE — 27829 TREAT LOWER LEG JOINT: CPT | Performed by: ORTHOPAEDIC SURGERY

## 2023-11-30 PROCEDURE — 2720000010 HC SURG SUPPLY STERILE: Performed by: ORTHOPAEDIC SURGERY

## 2023-11-30 DEVICE — SCREW BNE L14MM DIA3.5MM HD DIA2.7MM CORT PERIARTC S STL ST: Type: IMPLANTABLE DEVICE | Site: FIBULA | Status: FUNCTIONAL

## 2023-11-30 DEVICE — PLATE BNE L80MM 4 H L LAT DST PERIARTC FIBULAR S STL LOK: Type: IMPLANTABLE DEVICE | Site: FIBULA | Status: FUNCTIONAL

## 2023-11-30 DEVICE — SCREW BNE L18MM DIA2.7MM HEX HD DIA2.5MM CANC BIODUR 108C: Type: IMPLANTABLE DEVICE | Site: FIBULA | Status: FUNCTIONAL

## 2023-11-30 DEVICE — SCREW BNE L16MM DIA2.7MM HEX HD DIA2.5MM CANC BIODUR 108C: Type: IMPLANTABLE DEVICE | Site: FIBULA | Status: FUNCTIONAL

## 2023-11-30 DEVICE — SCREW BNE L16MM DIA3.5MM HD DIA2.7MM PERIARTC CORT S STL ST: Type: IMPLANTABLE DEVICE | Site: FIBULA | Status: FUNCTIONAL

## 2023-11-30 DEVICE — SCREW BNE L14MM DIA2.7MM HEX HD DIA2.5MM CANC BIODUR 108C: Type: IMPLANTABLE DEVICE | Site: FIBULA | Status: FUNCTIONAL

## 2023-11-30 DEVICE — SCREW BNE L56MM DIA3.5MM CORT PERIARTC S STL ST: Type: IMPLANTABLE DEVICE | Site: FIBULA | Status: FUNCTIONAL

## 2023-11-30 RX ORDER — LABETALOL HYDROCHLORIDE 5 MG/ML
10 INJECTION, SOLUTION INTRAVENOUS
Status: DISCONTINUED | OUTPATIENT
Start: 2023-11-30 | End: 2023-11-30 | Stop reason: HOSPADM

## 2023-11-30 RX ORDER — BUPIVACAINE HYDROCHLORIDE 5 MG/ML
INJECTION, SOLUTION EPIDURAL; INTRACAUDAL
Status: COMPLETED | OUTPATIENT
Start: 2023-11-30 | End: 2023-11-30

## 2023-11-30 RX ORDER — TRAMADOL HYDROCHLORIDE 50 MG/1
50 TABLET ORAL EVERY 6 HOURS PRN
Qty: 20 TABLET | Refills: 0 | Status: SHIPPED | OUTPATIENT
Start: 2023-11-30 | End: 2023-12-05

## 2023-11-30 RX ORDER — SODIUM CHLORIDE 9 MG/ML
INJECTION, SOLUTION INTRAVENOUS PRN
Status: DISCONTINUED | OUTPATIENT
Start: 2023-11-30 | End: 2023-11-30 | Stop reason: HOSPADM

## 2023-11-30 RX ORDER — SODIUM CHLORIDE 0.9 % (FLUSH) 0.9 %
5-40 SYRINGE (ML) INJECTION PRN
Status: DISCONTINUED | OUTPATIENT
Start: 2023-11-30 | End: 2023-11-30 | Stop reason: HOSPADM

## 2023-11-30 RX ORDER — HYDROMORPHONE HYDROCHLORIDE 2 MG/ML
0.5 INJECTION, SOLUTION INTRAMUSCULAR; INTRAVENOUS; SUBCUTANEOUS EVERY 5 MIN PRN
Status: DISCONTINUED | OUTPATIENT
Start: 2023-11-30 | End: 2023-11-30 | Stop reason: HOSPADM

## 2023-11-30 RX ORDER — SODIUM CHLORIDE 9 MG/ML
INJECTION, SOLUTION INTRAVENOUS CONTINUOUS
Status: DISCONTINUED | OUTPATIENT
Start: 2023-11-30 | End: 2023-11-30 | Stop reason: HOSPADM

## 2023-11-30 RX ORDER — FENTANYL CITRATE 50 UG/ML
INJECTION, SOLUTION INTRAMUSCULAR; INTRAVENOUS PRN
Status: DISCONTINUED | OUTPATIENT
Start: 2023-11-30 | End: 2023-11-30 | Stop reason: SDUPTHER

## 2023-11-30 RX ORDER — LIDOCAINE HYDROCHLORIDE 10 MG/ML
1 INJECTION, SOLUTION EPIDURAL; INFILTRATION; INTRACAUDAL; PERINEURAL
Status: DISCONTINUED | OUTPATIENT
Start: 2023-11-30 | End: 2023-11-30 | Stop reason: HOSPADM

## 2023-11-30 RX ORDER — OXYCODONE HYDROCHLORIDE 5 MG/1
5 TABLET ORAL
Status: COMPLETED | OUTPATIENT
Start: 2023-11-30 | End: 2023-11-30

## 2023-11-30 RX ORDER — PROCHLORPERAZINE EDISYLATE 5 MG/ML
5 INJECTION INTRAMUSCULAR; INTRAVENOUS
Status: DISCONTINUED | OUTPATIENT
Start: 2023-11-30 | End: 2023-11-30 | Stop reason: HOSPADM

## 2023-11-30 RX ORDER — SODIUM CHLORIDE 0.9 % (FLUSH) 0.9 %
5-40 SYRINGE (ML) INJECTION EVERY 12 HOURS SCHEDULED
Status: DISCONTINUED | OUTPATIENT
Start: 2023-11-30 | End: 2023-11-30 | Stop reason: HOSPADM

## 2023-11-30 RX ORDER — ONDANSETRON 2 MG/ML
4 INJECTION INTRAMUSCULAR; INTRAVENOUS
Status: DISCONTINUED | OUTPATIENT
Start: 2023-11-30 | End: 2023-11-30 | Stop reason: HOSPADM

## 2023-11-30 RX ORDER — ONDANSETRON 2 MG/ML
INJECTION INTRAMUSCULAR; INTRAVENOUS PRN
Status: DISCONTINUED | OUTPATIENT
Start: 2023-11-30 | End: 2023-11-30 | Stop reason: SDUPTHER

## 2023-11-30 RX ORDER — DEXAMETHASONE SODIUM PHOSPHATE 4 MG/ML
INJECTION, SOLUTION INTRA-ARTICULAR; INTRALESIONAL; INTRAMUSCULAR; INTRAVENOUS; SOFT TISSUE PRN
Status: DISCONTINUED | OUTPATIENT
Start: 2023-11-30 | End: 2023-11-30 | Stop reason: SDUPTHER

## 2023-11-30 RX ORDER — FENTANYL CITRATE 50 UG/ML
25 INJECTION, SOLUTION INTRAMUSCULAR; INTRAVENOUS EVERY 5 MIN PRN
Status: DISCONTINUED | OUTPATIENT
Start: 2023-11-30 | End: 2023-11-30 | Stop reason: HOSPADM

## 2023-11-30 RX ORDER — HYDRALAZINE HYDROCHLORIDE 20 MG/ML
10 INJECTION INTRAMUSCULAR; INTRAVENOUS
Status: DISCONTINUED | OUTPATIENT
Start: 2023-11-30 | End: 2023-11-30 | Stop reason: HOSPADM

## 2023-11-30 RX ORDER — LIDOCAINE HYDROCHLORIDE 20 MG/ML
INJECTION, SOLUTION INFILTRATION; PERINEURAL PRN
Status: DISCONTINUED | OUTPATIENT
Start: 2023-11-30 | End: 2023-11-30 | Stop reason: SDUPTHER

## 2023-11-30 RX ORDER — PROPOFOL 10 MG/ML
INJECTION, EMULSION INTRAVENOUS PRN
Status: DISCONTINUED | OUTPATIENT
Start: 2023-11-30 | End: 2023-11-30 | Stop reason: SDUPTHER

## 2023-11-30 RX ORDER — CEPHALEXIN 500 MG/1
500 CAPSULE ORAL 4 TIMES DAILY
Qty: 20 CAPSULE | Refills: 0 | Status: SHIPPED | OUTPATIENT
Start: 2023-11-30 | End: 2023-12-05

## 2023-11-30 RX ORDER — MIDAZOLAM HYDROCHLORIDE 1 MG/ML
INJECTION INTRAMUSCULAR; INTRAVENOUS PRN
Status: DISCONTINUED | OUTPATIENT
Start: 2023-11-30 | End: 2023-11-30 | Stop reason: SDUPTHER

## 2023-11-30 RX ADMIN — ONDANSETRON 4 MG: 2 INJECTION INTRAMUSCULAR; INTRAVENOUS at 07:36

## 2023-11-30 RX ADMIN — FENTANYL CITRATE 25 MCG: 50 INJECTION, SOLUTION INTRAMUSCULAR; INTRAVENOUS at 08:21

## 2023-11-30 RX ADMIN — PHENYLEPHRINE HYDROCHLORIDE 200 MCG: 10 INJECTION INTRAVENOUS at 08:17

## 2023-11-30 RX ADMIN — FENTANYL CITRATE 25 MCG: 50 INJECTION, SOLUTION INTRAMUSCULAR; INTRAVENOUS at 08:24

## 2023-11-30 RX ADMIN — PHENYLEPHRINE HYDROCHLORIDE 100 MCG: 10 INJECTION INTRAVENOUS at 07:53

## 2023-11-30 RX ADMIN — CEFAZOLIN 2000 MG: 2 INJECTION, POWDER, FOR SOLUTION INTRAMUSCULAR; INTRAVENOUS at 07:31

## 2023-11-30 RX ADMIN — LIDOCAINE HYDROCHLORIDE 100 MG: 20 INJECTION, SOLUTION INFILTRATION; PERINEURAL at 07:36

## 2023-11-30 RX ADMIN — PHENYLEPHRINE HYDROCHLORIDE 100 MCG: 10 INJECTION INTRAVENOUS at 08:06

## 2023-11-30 RX ADMIN — PHENYLEPHRINE HYDROCHLORIDE 100 MCG: 10 INJECTION INTRAVENOUS at 07:58

## 2023-11-30 RX ADMIN — OXYCODONE 5 MG: 5 TABLET ORAL at 08:45

## 2023-11-30 RX ADMIN — FENTANYL CITRATE 50 MCG: 50 INJECTION, SOLUTION INTRAMUSCULAR; INTRAVENOUS at 07:37

## 2023-11-30 RX ADMIN — DEXAMETHASONE SODIUM PHOSPHATE 10 MG: 4 INJECTION, SOLUTION INTRAMUSCULAR; INTRAVENOUS at 07:41

## 2023-11-30 RX ADMIN — PHENYLEPHRINE HYDROCHLORIDE 200 MCG: 10 INJECTION INTRAVENOUS at 07:44

## 2023-11-30 RX ADMIN — MIDAZOLAM 2 MG: 1 INJECTION INTRAMUSCULAR; INTRAVENOUS at 07:32

## 2023-11-30 RX ADMIN — SODIUM CHLORIDE: 9 INJECTION, SOLUTION INTRAVENOUS at 06:36

## 2023-11-30 RX ADMIN — PROPOFOL 200 MG: 10 INJECTION, EMULSION INTRAVENOUS at 07:38

## 2023-11-30 ASSESSMENT — PAIN DESCRIPTION - ORIENTATION: ORIENTATION: LEFT

## 2023-11-30 ASSESSMENT — PAIN - FUNCTIONAL ASSESSMENT
PAIN_FUNCTIONAL_ASSESSMENT: 0-10
PAIN_FUNCTIONAL_ASSESSMENT: PREVENTS OR INTERFERES SOME ACTIVE ACTIVITIES AND ADLS

## 2023-11-30 ASSESSMENT — PAIN DESCRIPTION - ONSET: ONSET: GRADUAL

## 2023-11-30 ASSESSMENT — ENCOUNTER SYMPTOMS: SHORTNESS OF BREATH: 0

## 2023-11-30 ASSESSMENT — PAIN DESCRIPTION - LOCATION: LOCATION: ANKLE

## 2023-11-30 ASSESSMENT — PAIN DESCRIPTION - DESCRIPTORS
DESCRIPTORS: ACHING
DESCRIPTORS: ACHING

## 2023-11-30 ASSESSMENT — PAIN DESCRIPTION - PAIN TYPE: TYPE: SURGICAL PAIN

## 2023-11-30 ASSESSMENT — PAIN SCALES - GENERAL: PAINLEVEL_OUTOF10: 2

## 2023-11-30 NOTE — DISCHARGE INSTRUCTIONS
Post op instruction:  1- D/C home  2- Dx Left ankle pain / lateral malleolus fracture. 3- NWB left ankle  4- Elevation surgical site, with ice  5- Keep splint dry and clean  6- F/U in 2 weeks. 7- For DVT prophylaxis- Aspirin 325 mg daily     Vitaly Curtis MD, 11/30/2023      ORTHOPEDIC/PODIATRY DISCHARGE INSTRUCTIONS    Follow your surgeons instructions. Make follow-up appointment. Observe operative area for signs of excessive bleeding such as a slow general ooze that saturates the dressing or bright red bleeding. In either case, apply pressure to the area and elevate if possible and call your surgeon right away. Observe the affected extremity for circulation or nerve impairment such as a change in color, numbness, tingling, coldness or increased pain. If any of these symptoms are present call your surgeon. Observe operative site for any signs of infection such as increased pain, redness, fever greater than 101 degrees, swelling, foul odor or drainage. Contact surgeon if any of these symptoms are present. If you become short of breath call your surgeon or go to the nearest emergency room. You may loosen your ace wrap if it feels too tight, or if you have severe pain, or if it has swelling. Elevate extremity as directed by surgeon. You may shower when directed by surgeon. Use ice pack as directed by surgeon. Do not use heat. Avoid stress to suture line such as pulling, pushing or tugging. Use crutches as directed by surgeon  Take medications as ordered. Take pain medication with food. Do not drive or operate machinery while taking narcotics. Call your surgeon for any questions or problems. ANESTHESIA DISCHARGE INSTRUCTIONS    Wear your seatbelt home. You are under the influence of drugs-do not drink alcohol, drive, operate machinery, make any important decisions or sign any legal documents for 24 hours. A responsible adult needs to be with you for 24 hours.   You may experience lightheadedness,

## 2023-11-30 NOTE — BRIEF OP NOTE
Brief Postoperative Note      Patient: Eldon De La Cruz  YOB: 1960  MRN: [id number]    Date of Procedure: 11/30/2023    Pre-Op Diagnosis Codes:     * Closed fracture of left ankle, initial encounter [S82.892A]    Post-Op Diagnosis: Same       Procedure(s):  LEFT OPEN REDUCTION AND INTERNAL FIXATION DISTAL FIBULA FRACTURE- JAZZMINE, SYNDESMOSIS REPAIR. Surgeon(s):  Eli Patel MD    Assistant: David Ward CNP    Anesthesia: General    Estimated Blood Loss (mL): Minimal    Complications: None    Specimens:   * No specimens in log *    Implants:  Implant Name Type Inv. Item Serial No.  Lot No. LRB No. Used Action   PLATE BNE [id number] H L LAT DST PERIARTC FIBULAR S STL MARIAJOSE - [id number]  PLATE BNE [id number] 4 H L LAT DST PERIARTC FIBULAR S STL MARIAJOSE  JAZZMINE BIOMET TRAUMA-WD  Left 1 Implanted   SCREW BNE L14MM DIA3. 5MM HD DIA2.7MM ЮЛИЯ PERIARTC S STL ST - [id number]  SCREW BNE L14MM DIA3. 5MM HD DIA2.7MM ЮЛИЯ PERIARTC S STL ST  JAZZMINE BIOMET TRAUMA-WD  Left 1 Implanted   SCREW BNE L16MM DIA3. 5MM HD DIA2.7MM PERIARTC ЮЛИЯ S STL ST - [id number]  SCREW BNE L16MM DIA3. 5MM HD DIA2.7MM PERIARTC ЮЛИЯ S STL ST  JAZZMINE BIOMET TRAUMA-WD  Left 1 Implanted   SCREW BNE L14MM DIA2.7MM HEX HD DIA2.5MM CANC BIODUR 108C - [id number]  SCREW BNE L14MM DIA2.7MM HEX HD DIA2.5MM CANC BIODUR 108C  JAZZMINE BIOMET TRAUMA-WD  Left 1 Implanted   SCREW BNE L16MM DIA2.7MM HEX HD DIA2.5MM CANC BIODUR 108C - [id number]  SCREW BNE L16MM DIA2.7MM HEX HD DIA2.5MM CANC BIODUR 108C  JAZZMINE BIOMET TRAUMA-WD  Left 1 Implanted   SCREW BNE L18MM DIA2.7MM HEX HD DIA2.5MM CANC BIODUR 108C - [id number]  SCREW BNE L18MM DIA2.7MM HEX HD DIA2.5MM CANC BIODUR 108C  JAZZMINE BIOMET TRAUMA-WD  Left 3 Implanted   SCREW BNE L56MM DIA3.5MM ЮЛИЯ PERIARTC S STL ST - [id number]  SCREW BNE L56MM DIA3.5MM ЮЛИЯ PERIARTC S STL ST  JAZZMINE BIOMET TRAUMA-WD  Left 1 Implanted         Drains: * No LDAs found *    Findings: Same.       Electronically signed by Robson Sweeney

## 2023-11-30 NOTE — ANESTHESIA PRE PROCEDURE
Department of Anesthesiology  Preprocedure Note       Name:  Deisi Taylor   Age:  58 y.o.  :  1960                                          MRN:  [id number]         Date:  2023      Surgeon: Karen Avery):  Cheri Flanagan MD    Procedure: Procedure(s):  LEFT OPEN REDUCTION AND INTERNAL FIXATION DISTAL FIBULA FRACTURE- Ana Mariasuresh Szymanski    Medications prior to admission:   Prior to Admission medications    Medication Sig Start Date End Date Taking? Authorizing Provider   aspirin 81 MG chewable tablet Take 1 tablet by mouth daily 23   Lito Husain MD   cetirizine (ZYRTEC) 10 MG tablet Take 1 tablet by mouth three times a week As needed per pt. Lito Husain MD   Multiple Vitamin (MULTI VITAMIN DAILY PO) Take by mouth    Lito Husain MD       Current medications:    Current Facility-Administered Medications   Medication Dose Route Frequency Provider Last Rate Last Admin    ceFAZolin (ANCEF) 2,000 mg in sodium chloride 0.9 % 50 mL IVPB (mini-bag)  2,000 mg IntraVENous On Call to OR Cheri Flanagan  mL/hr at 23 0636 2,000 mg at 23 0636    0.9 % sodium chloride infusion   IntraVENous Continuous Cheri Flanagan  mL/hr at 23 0636 New Bag at 23 0636       Allergies:  No Known Allergies    Problem List:    Patient Active Problem List   Diagnosis Code    Mixed hyperlipidemia E78.2    Allergic rhinitis J30.9    Cardiac murmur due to mitral valve disorder I34.0    Severe mitral regurgitation I34.0    Palpitations R00.2    Fatigue R53.83    Other chest pain R07.89    S/P mitral valve repair Z98.890    Strain of left hip adductor muscle S76.012A    Rotator cuff tendinitis, right M75.81    Closed displaced fracture of lateral malleolus of left fibula S82. 62XA    Sprain of right ankle S93.401A       Past Medical History:        Diagnosis Date    Cancer ([address]) 2019    Prostate    Hyperlipidemia        Past Surgical History:        Procedure Laterality Date

## 2023-11-30 NOTE — PROGRESS NOTES
Discharge instructions reviewed with patient and pts wife Linus Lamberto. All home medications have been reviewed, pt v/u. Discharge instructions signed. Pt discharged via wheelchair. Pt discharged with crutches, boot and all belongings. Wife Linus Sagartyrese taking stable pt home.

## 2023-11-30 NOTE — PROGRESS NOTES
Pt arrived from OR to PACU bay 2. Report received from OR staff. Pt arousable to voice. Surgical incisions dressings in place to L foot. Pt on 6L 02, NSR, and VSS. Will continue to monitor.

## 2023-12-01 NOTE — OP NOTE
[facility]                     [address], [address]                                OPERATIVE REPORT    PATIENT NAME: Hector Chao                    :        1960  MED REC NO:   [id number]                          ROOM:  ACCOUNT NO:   [de-identified]                           ADMIT DATE: 2023  PROVIDER:     Medina Garza MD    DATE OF PROCEDURE:  2023    PRIMARY CARE PHYSICIAN:  Eri Roque MD.    PREOPERATIVE DIAGNOSES:  1. Left ankle lateral malleolus displaced fracture. 2.  Left ankle distal tibiofibular syndesmosis disruption. POSTOPERATIVE DIAGNOSES:  1. Left ankle lateral malleolus displaced fracture. 2.  Left ankle distal tibiofibular syndesmosis disruption. OPERATION PERFORMED:  1. Open treatment of left ankle lateral malleolus fracture with open  reduction and internal fixation. 2.  Open treatment of left ankle distal tibiofibular syndesmosis  disruption with a syndesmosis screw. SURGEON:  Medina Garza MD.    ASSISTANT:  Charlee Aguilar CNP. ANESTHESIA:  General anesthesia. ESTIMATED BLOOD LOSS:  Minimal.    COMPLICATIONS:  None. TOURNIQUET:  Left upper calf 250 mmHg. IMPLANT USED:  1. Kellee distal fibula locking plate and one lag screw. 2.  Kellee 3.5 cortical screw x1 for syndesmosis repair. INDICATIONS:  This is a 40-year-old white male who sustained an injury  when [place] was [place]. He stepped in a hole and sustained injury to  his bilateral ankles, left more than right. He was treated in urgent  care and is seen in our office and was found to have a displaced lateral  malleolar fracture on the left side with ankle sprain on the right side. We recommended surgical fixation. All risks, benefits, and alternatives  were discussed with the patient and he agreed to proceed with the  surgical repair. OPERATIVE PROCEDURE:  The patient's left ankle was marked.   He received  2 gm Ancef IV

## 2023-12-04 ENCOUNTER — HOSPITAL ENCOUNTER (OUTPATIENT)
Age: 63
Discharge: HOME | End: 2023-12-04
Payer: COMMERCIAL

## 2023-12-04 DIAGNOSIS — I34.0 SEVERE MITRAL REGURGITATION: ICD-10-CM

## 2023-12-04 DIAGNOSIS — E78.2 MIXED HYPERLIPIDEMIA: ICD-10-CM

## 2023-12-04 DIAGNOSIS — Z98.890 S/P MITRAL VALVE REPAIR: ICD-10-CM

## 2023-12-04 LAB
CHOLEST SERPL-MCNC: 231 MG/DL (ref 0–199)
HDLC SERPL-MCNC: 64 MG/DL (ref 40–60)
LDL CHOLESTEROL CALCULATED: 149 MG/DL
TRIGL SERPL-MCNC: 91 MG/DL (ref 0–150)
VLDLC SERPL CALC-MCNC: 18 MG/DL

## 2023-12-04 PROCEDURE — 80061 LIPID PANEL: CPT

## 2023-12-04 PROCEDURE — 36415 COLL VENOUS BLD VENIPUNCTURE: CPT

## 2023-12-14 ENCOUNTER — OFFICE VISIT (OUTPATIENT)
Dept: ORTHOPEDIC SURGERY | Age: 63
End: 2023-12-14

## 2023-12-14 VITALS — HEIGHT: 70 IN | BODY MASS INDEX: 25.06 KG/M2 | WEIGHT: 175.04 LBS

## 2023-12-14 DIAGNOSIS — S82.62XA CLOSED DISPLACED FRACTURE OF LATERAL MALLEOLUS OF LEFT FIBULA, INITIAL ENCOUNTER: Primary | ICD-10-CM

## 2023-12-14 DIAGNOSIS — S93.432A SYNDESMOTIC DISRUPTION OF LEFT ANKLE, INITIAL ENCOUNTER: ICD-10-CM

## 2023-12-14 PROCEDURE — APPNB15 APP NON BILLABLE TIME 0-15 MINS: Performed by: NURSE PRACTITIONER

## 2023-12-14 PROCEDURE — 99024 POSTOP FOLLOW-UP VISIT: CPT | Performed by: NURSE PRACTITIONER

## 2023-12-14 NOTE — PROGRESS NOTES
DIAGNOSIS:    1-Left ankle lateral malleolus displaced fracture, status post ORIF. 2-Left ankle distal tibiofibular syndesmosis disruption, status post ORIF syndesmosis screw    DATE OF SURGERY: 11/30/2023. HISTORY OF PRESENT ILLNESS:  Mr. Sidney Segundo 58 y.o.  male who came in today for 2 weeks postoperative visit. The patient denies any significant pain in the left ankle. Rates pain a 0/10 VAS and doing very well. He has been in a splint, and non WB. No numbness or tingling sensation. No fever or Chills. He works as a  of a [employer]. Denies smoking. PHYSICAL EXAMINATION:  The incision healing well. No signs of any erythema or drainage, minimal swelling. He has no pain with the active or passive range of motion of the left ankle, but decrease ROM. He has intact sensation distally, and he is neurovascularly intact. IMAGING:  Three views left ankle taken today in the office showed anatomic alignment of the fracture, plate and screws in good position, no loosening. Ankle mortise is well centered. Syndesmosis screw intact. IMPRESSION:  2 weeks out from left ankle lateral malleolus ORIF with syndesmosis repair and doing very well. PLAN: He placed in a boot, and TT WB. I have told the patient to work on ROM. ASA for DVT prophylaxis. The patient will come back for a follow up in 6 weeks. At that time, we will take 3 views of the left ankle standing. He will likely need a staged procedure for syndesmosis screw removal at 4-5 months.      Rx given for handicap mariela Aleman, RAMÍREZ - CNP

## 2024-01-02 ENCOUNTER — TELEPHONE (OUTPATIENT)
Dept: ORTHOPEDIC SURGERY | Age: 64
End: 2024-01-02

## 2024-01-02 NOTE — TELEPHONE ENCOUNTER
After removal of PO dressing, swelling set into the left foot and lower leg. Denies calf pain or swelling. Denies red or shiny skin. No concerns reported at the incision. She reports he has been maintaining the TT WB, but has been up and about a lot. Not elevating above heart level.    I instructed that this sounds like it is due to the increased activity and not elevating above heart level. Suggested he could try an Ace wrap (tighter at toes and more loosely up the leg) or a compression stocking to help. Lillie will send theRightAPIhart pictures if they have further concerns about the swelling. She will instruct Isidoro to elevate above heart level. Will notify C today and only follow up if she has other concerns or advice.

## 2024-01-02 NOTE — TELEPHONE ENCOUNTER
General Question     Subject: POST OP SWELLING AT 3 WEEKS  Patient and /or Facility Request: Isidoro Maxwell   Contact Number: [phone]      PATIENTS WIFE CALLED IN WITH CONCERNS ABOUT HUSBANDS SWELLING AND THE COLOR SURROUNDING THE SWELLING     PLEASE CALL PATIENTS WIFE BACK AT THE NUMBER LISTED ABOVE

## 2024-01-25 ENCOUNTER — OFFICE VISIT (OUTPATIENT)
Dept: ORTHOPEDIC SURGERY | Age: 64
End: 2024-01-25

## 2024-01-25 VITALS — WEIGHT: 175 LBS | HEIGHT: 70 IN | BODY MASS INDEX: 25.05 KG/M2

## 2024-01-25 DIAGNOSIS — S82.62XA CLOSED DISPLACED FRACTURE OF LATERAL MALLEOLUS OF LEFT FIBULA, INITIAL ENCOUNTER: Primary | ICD-10-CM

## 2024-01-25 PROCEDURE — 99024 POSTOP FOLLOW-UP VISIT: CPT | Performed by: NURSE PRACTITIONER

## 2024-01-25 PROCEDURE — APPNB15 APP NON BILLABLE TIME 0-15 MINS: Performed by: NURSE PRACTITIONER

## 2024-01-25 NOTE — PROGRESS NOTES
DIAGNOSIS:    1-Left ankle lateral malleolus displaced fracture, status post ORIF.  2-Left ankle distal tibiofibular syndesmosis disruption, status post ORIF syndesmosis screw    DATE OF SURGERY: 11/30/2023.    HISTORY OF PRESENT ILLNESS:  Mr. Maxwell 63 y.o.  male who came in today for 8 weeks postoperative visit.  The patient denies any significant pain in the left ankle. Rates pain a 0/10 VAS and doing very well.  He has been in a boot and WB.  No numbness or tingling sensation. No fever or Chills.  He works as a  of a [employer].  Denies smoking.    PHYSICAL EXAMINATION:  The incision healing well.  No signs of any erythema or drainage, minimal swelling. He has no pain with the active or passive range of motion of the left ankle, but decrease ROM.  He has intact sensation distally, and he is neurovascularly intact.    IMAGING:  Three views left ankle taken today in the office showed anatomic alignment of the fracture, plate and screws in good position, no loosening. Ankle mortise is well centered.  Syndesmosis screw intact.    IMPRESSION:  8 weeks out from left ankle lateral malleolus ORIF with syndesmosis repair and doing very well.    PLAN: He will be WBAT in the boot, and start aggressive ROM and peroneal strengthening exercise. Off the boot in this week. No heavy impact activities.  The patient will come back for a follow up in 6 weeks.  At that time, we will take 3 views of the left ankle standing. He will likely need a staged procedure for syndesmosis screw removal at 4-5 months.     He has a handicap mariela Hodge, APRN - CNP

## 2024-02-06 RX ORDER — AMOXICILLIN 500 MG/1
2000 CAPSULE ORAL ONCE
Qty: 4 CAPSULE | Refills: 3 | Status: SHIPPED | OUTPATIENT
Start: 2024-02-06 | End: 2024-02-06

## 2024-03-07 ENCOUNTER — OFFICE VISIT (OUTPATIENT)
Dept: ORTHOPEDIC SURGERY | Age: 64
End: 2024-03-07
Payer: COMMERCIAL

## 2024-03-07 VITALS — HEIGHT: 70 IN | BODY MASS INDEX: 25.06 KG/M2 | WEIGHT: 175.04 LBS

## 2024-03-07 DIAGNOSIS — S93.432A SYNDESMOTIC DISRUPTION OF LEFT ANKLE, INITIAL ENCOUNTER: Primary | ICD-10-CM

## 2024-03-07 DIAGNOSIS — S82.62XA CLOSED DISPLACED FRACTURE OF LATERAL MALLEOLUS OF LEFT FIBULA, INITIAL ENCOUNTER: ICD-10-CM

## 2024-03-07 PROCEDURE — 99213 OFFICE O/P EST LOW 20 MIN: CPT | Performed by: ORTHOPAEDIC SURGERY

## 2024-03-07 NOTE — PROGRESS NOTES
DIAGNOSIS:    1-Left ankle lateral malleolus displaced fracture, status post ORIF.  2-Left ankle distal tibiofibular syndesmosis disruption, status post ORIF syndesmosis screw    DATE OF SURGERY: 11/30/2023.    HISTORY OF PRESENT ILLNESS:  Isidoro Maxwell 63 y.o.  male who came in today for 3 months postoperative visit.  The patient denies any significant pain in the left ankle. Rates pain a 0/10 VAS and doing very well.  He has been in regular shoes and WB.  No numbness or tingling sensation. No fever or Chills.  He works as a  of a [employer].  Denies smoking.    Past Medical History:   Diagnosis Date    Cancer (HCC) 11/2019    Prostate    Hyperlipidemia        Past Surgical History:   Procedure Laterality Date    FIBULA FRACTURE SURGERY Left 11/30/2023    LEFT OPEN REDUCTION AND INTERNAL FIXATION DISTAL FIBULA FRACTURE- JAZZMINE performed by Mirtha Quintana MD at [facility] OR    HERNIA REPAIR  '90 and '95    HOME SLEEP STUDY  06/01/2020         MITRAL VALVE REPAIR  01/03/2023    TONSILLECTOMY         Social History     Socioeconomic History    Marital status:      Spouse name: Lillie    Number of children: 3    Years of education: 20    Highest education level: Not on file   Occupational History    Occupation:      Comment: [employer]   Tobacco Use    Smoking status: Never    Smokeless tobacco: Never   Vaping Use    Vaping Use: Never used   Substance and Sexual Activity    Alcohol use: Yes     Comment: 2-7 beer, wine, or liquor    Drug use: Never    Sexual activity: Not on file   Other Topics Concern    Not on file   Social History Narrative    Not on file     Social Determinants of Health     Financial Resource Strain: Not on file   Food Insecurity: Not on file   Transportation Needs: Not on file   Physical Activity: Not on file   Stress: Not on file   Social Connections: Not on file   Intimate Partner Violence: Not on file   Housing Stability: Not on file       Family

## 2024-04-11 ENCOUNTER — OFFICE VISIT (OUTPATIENT)
Dept: ORTHOPEDIC SURGERY | Age: 64
End: 2024-04-11
Payer: COMMERCIAL

## 2024-04-11 ENCOUNTER — PREP FOR PROCEDURE (OUTPATIENT)
Dept: ORTHOPEDIC SURGERY | Age: 64
End: 2024-04-11

## 2024-04-11 VITALS — BODY MASS INDEX: 25.05 KG/M2 | HEIGHT: 70 IN | WEIGHT: 175 LBS

## 2024-04-11 DIAGNOSIS — S93.432A SYNDESMOTIC DISRUPTION OF LEFT ANKLE, INITIAL ENCOUNTER: Primary | ICD-10-CM

## 2024-04-11 DIAGNOSIS — S82.62XA CLOSED DISPLACED FRACTURE OF LATERAL MALLEOLUS OF LEFT FIBULA, INITIAL ENCOUNTER: ICD-10-CM

## 2024-04-11 PROCEDURE — 99214 OFFICE O/P EST MOD 30 MIN: CPT | Performed by: ORTHOPAEDIC SURGERY

## 2024-04-11 NOTE — PROGRESS NOTES
DIAGNOSIS:    1-Left ankle lateral malleolus displaced fracture, status post ORIF.  2-Left ankle distal tibiofibular syndesmosis disruption, status post ORIF syndesmosis screw    DATE OF SURGERY: 11/30/2023.    HISTORY OF PRESENT ILLNESS:  Isidoro Maxwell 63 y.o.  male who came in today for 3 months postoperative visit.  The patient denies any significant pain in the left ankle. Rates pain a 0/10 VAS and doing very well.  He does feel some tightness in achy pain after cutting his grass on a incline.  He has been in regular shoes and WB.  No numbness or tingling sensation. No fever or Chills.  He works as a  of a [employer].  Denies smoking.    Past Medical History:   Diagnosis Date    Cancer (HCC) 11/2019    Prostate    Hyperlipidemia        Past Surgical History:   Procedure Laterality Date    FIBULA FRACTURE SURGERY Left 11/30/2023    LEFT OPEN REDUCTION AND INTERNAL FIXATION DISTAL FIBULA FRACTURE- JAZZMINE performed by Mirtha Quintana MD at [facility] OR    HERNIA REPAIR  '90 and '95    HOME SLEEP STUDY  06/01/2020         MITRAL VALVE REPAIR  01/03/2023    TONSILLECTOMY         Social History     Socioeconomic History    Marital status:      Spouse name: Lillie    Number of children: 3    Years of education: 20    Highest education level: Not on file   Occupational History    Occupation:      Comment: [employer]   Tobacco Use    Smoking status: Never    Smokeless tobacco: Never   Vaping Use    Vaping Use: Never used   Substance and Sexual Activity    Alcohol use: Yes     Comment: 2-7 beer, wine, or liquor    Drug use: Never    Sexual activity: Not on file   Other Topics Concern    Not on file   Social History Narrative    Not on file     Social Determinants of Health     Financial Resource Strain: Not on file   Food Insecurity: Not on file   Transportation Needs: Not on file   Physical Activity: Not on file   Stress: Not on file   Social Connections: Not on file   Intimate

## 2024-04-12 ENCOUNTER — TELEPHONE (OUTPATIENT)
Dept: ORTHOPEDIC SURGERY | Age: 64
End: 2024-04-12

## 2024-04-12 NOTE — TELEPHONE ENCOUNTER
Auth: NPR  Date: 04/18/24 thru 07/17/24  Reference # [id number]  Spoke with: Online  Type of SX:Outpatient  Location: [facility]  CPT: [id number]   DX: T84.84XA  SX area: Lt ankle  Insurance: David

## 2024-04-12 NOTE — PROGRESS NOTES
Name_______________________________________Printed:____________________  Date and time of surgery__4/18/2024______________________Arrival Time:________________   1. The instructions given regarding when and if a patient needs to stop oral intake prior to surgery varies.Follow the specific instructions you were given                  _x__Nothing to eat or to drink after Midnight the night before.                   ____Carbo loading or instructions will be given to select patients-if you have been given those instructions -please do the following                           The evening before your surgery after dinner before midnight drink 40 ounces of gatorade.If you are diabetic use sugar free.  The morning of surgery drink 40 ounces of water.This needs to be finished 3 hours prior to your surgery start time.    2. Take the following pills with a small sip of water on the morning of surgery___________________________________________________                  Do not take blood pressure medications ending in pril or sartan the serafin prior to surgery or the morning of surgery. Dr Yoon's patient are not to take any medications the AM of surgery.         3. Aspirin, Ibuprofen, Advil, Naproxen, Vitamin E and other Anti-inflammatory products and supplements should be stopped for 5 -7days before surgery or as directed by your physician.   4. Check with your Doctor regarding stopping Plavix, Coumadin,Eliquis, Lovenox,Effient,Pradaxa,Xarelto, Fragmin or other blood thinners and follow their instructions.   5. Do not smoke, and do not drink any alcoholic beverages 24 hours prior to surgery.  This includes NA Beer.Refrain from the usage of any recreational drugs.   6. You may brush your teeth and gargle the morning of surgery.  DO NOT SWALLOW WATER   7. You MUST make arrangements for a responsible adult to stay on site while you are here and take you home after your surgery. You will not be allowed to leave alone or drive yourself

## 2024-04-18 ENCOUNTER — ANESTHESIA EVENT (OUTPATIENT)
Dept: OPERATING ROOM | Age: 64
End: 2024-04-18
Payer: COMMERCIAL

## 2024-04-18 ENCOUNTER — HOSPITAL ENCOUNTER (OUTPATIENT)
Age: 64
Setting detail: OUTPATIENT SURGERY
Discharge: HOME | End: 2024-04-18
Attending: ORTHOPAEDIC SURGERY | Admitting: ORTHOPAEDIC SURGERY
Payer: COMMERCIAL

## 2024-04-18 ENCOUNTER — ANESTHESIA (OUTPATIENT)
Dept: OPERATING ROOM | Age: 64
End: 2024-04-18
Payer: COMMERCIAL

## 2024-04-18 VITALS
OXYGEN SATURATION: 100 % | HEART RATE: 66 BPM | SYSTOLIC BLOOD PRESSURE: 119 MMHG | WEIGHT: 175 LBS | HEIGHT: 70 IN | TEMPERATURE: 97.8 F | RESPIRATION RATE: 12 BRPM | BODY MASS INDEX: 25.05 KG/M2 | DIASTOLIC BLOOD PRESSURE: 78 MMHG

## 2024-04-18 PROBLEM — Z96.9 RETAINED ORTHOPEDIC HARDWARE: Status: ACTIVE | Noted: 2024-04-18

## 2024-04-18 PROCEDURE — 2580000003 HC RX 258: Performed by: ANESTHESIOLOGY

## 2024-04-18 PROCEDURE — 6370000000 HC RX 637 (ALT 250 FOR IP): Performed by: ANESTHESIOLOGY

## 2024-04-18 PROCEDURE — 6360000002 HC RX W HCPCS: Performed by: NURSE ANESTHETIST, CERTIFIED REGISTERED

## 2024-04-18 PROCEDURE — 2500000003 HC RX 250 WO HCPCS: Performed by: NURSE ANESTHETIST, CERTIFIED REGISTERED

## 2024-04-18 PROCEDURE — 6360000002 HC RX W HCPCS: Performed by: ORTHOPAEDIC SURGERY

## 2024-04-18 PROCEDURE — 7100000001 HC PACU RECOVERY - ADDTL 15 MIN: Performed by: ORTHOPAEDIC SURGERY

## 2024-04-18 PROCEDURE — 3600000014 HC SURGERY LEVEL 4 ADDTL 15MIN: Performed by: ORTHOPAEDIC SURGERY

## 2024-04-18 PROCEDURE — 7100000011 HC PHASE II RECOVERY - ADDTL 15 MIN: Performed by: ORTHOPAEDIC SURGERY

## 2024-04-18 PROCEDURE — 3700000001 HC ADD 15 MINUTES (ANESTHESIA): Performed by: ORTHOPAEDIC SURGERY

## 2024-04-18 PROCEDURE — 7100000000 HC PACU RECOVERY - FIRST 15 MIN: Performed by: ORTHOPAEDIC SURGERY

## 2024-04-18 PROCEDURE — 2709999900 HC NON-CHARGEABLE SUPPLY: Performed by: ORTHOPAEDIC SURGERY

## 2024-04-18 PROCEDURE — 2580000003 HC RX 258: Performed by: ORTHOPAEDIC SURGERY

## 2024-04-18 PROCEDURE — 3700000000 HC ANESTHESIA ATTENDED CARE: Performed by: ORTHOPAEDIC SURGERY

## 2024-04-18 PROCEDURE — 3600000004 HC SURGERY LEVEL 4 BASE: Performed by: ORTHOPAEDIC SURGERY

## 2024-04-18 PROCEDURE — 7100000010 HC PHASE II RECOVERY - FIRST 15 MIN: Performed by: ORTHOPAEDIC SURGERY

## 2024-04-18 RX ORDER — MIDAZOLAM HYDROCHLORIDE 1 MG/ML
INJECTION INTRAMUSCULAR; INTRAVENOUS PRN
Status: DISCONTINUED | OUTPATIENT
Start: 2024-04-18 | End: 2024-04-18 | Stop reason: SDUPTHER

## 2024-04-18 RX ORDER — MEPERIDINE HYDROCHLORIDE 25 MG/ML
12.5 INJECTION INTRAMUSCULAR; INTRAVENOUS; SUBCUTANEOUS EVERY 5 MIN PRN
Status: DISCONTINUED | OUTPATIENT
Start: 2024-04-18 | End: 2024-04-18 | Stop reason: HOSPADM

## 2024-04-18 RX ORDER — DEXAMETHASONE SODIUM PHOSPHATE 4 MG/ML
INJECTION, SOLUTION INTRA-ARTICULAR; INTRALESIONAL; INTRAMUSCULAR; INTRAVENOUS; SOFT TISSUE PRN
Status: DISCONTINUED | OUTPATIENT
Start: 2024-04-18 | End: 2024-04-18 | Stop reason: SDUPTHER

## 2024-04-18 RX ORDER — SODIUM CHLORIDE 9 MG/ML
INJECTION, SOLUTION INTRAVENOUS PRN
Status: DISCONTINUED | OUTPATIENT
Start: 2024-04-18 | End: 2024-04-18 | Stop reason: HOSPADM

## 2024-04-18 RX ORDER — PROPOFOL 10 MG/ML
INJECTION, EMULSION INTRAVENOUS PRN
Status: DISCONTINUED | OUTPATIENT
Start: 2024-04-18 | End: 2024-04-18 | Stop reason: SDUPTHER

## 2024-04-18 RX ORDER — SODIUM CHLORIDE 0.9 % (FLUSH) 0.9 %
5-40 SYRINGE (ML) INJECTION PRN
Status: DISCONTINUED | OUTPATIENT
Start: 2024-04-18 | End: 2024-04-18 | Stop reason: HOSPADM

## 2024-04-18 RX ORDER — HYDROMORPHONE HYDROCHLORIDE 2 MG/ML
0.25 INJECTION, SOLUTION INTRAMUSCULAR; INTRAVENOUS; SUBCUTANEOUS EVERY 5 MIN PRN
Status: DISCONTINUED | OUTPATIENT
Start: 2024-04-18 | End: 2024-04-18 | Stop reason: HOSPADM

## 2024-04-18 RX ORDER — CEPHALEXIN 500 MG/1
500 CAPSULE ORAL 4 TIMES DAILY
Qty: 12 CAPSULE | Refills: 0 | Status: SHIPPED | OUTPATIENT
Start: 2024-04-18 | End: 2024-04-21

## 2024-04-18 RX ORDER — HYDROMORPHONE HYDROCHLORIDE 2 MG/ML
0.5 INJECTION, SOLUTION INTRAMUSCULAR; INTRAVENOUS; SUBCUTANEOUS EVERY 5 MIN PRN
Status: DISCONTINUED | OUTPATIENT
Start: 2024-04-18 | End: 2024-04-18 | Stop reason: HOSPADM

## 2024-04-18 RX ORDER — NALOXONE HYDROCHLORIDE 0.4 MG/ML
INJECTION, SOLUTION INTRAMUSCULAR; INTRAVENOUS; SUBCUTANEOUS PRN
Status: DISCONTINUED | OUTPATIENT
Start: 2024-04-18 | End: 2024-04-18 | Stop reason: HOSPADM

## 2024-04-18 RX ORDER — ONDANSETRON 2 MG/ML
4 INJECTION INTRAMUSCULAR; INTRAVENOUS
Status: DISCONTINUED | OUTPATIENT
Start: 2024-04-18 | End: 2024-04-18 | Stop reason: HOSPADM

## 2024-04-18 RX ORDER — SODIUM CHLORIDE 0.9 % (FLUSH) 0.9 %
5-40 SYRINGE (ML) INJECTION EVERY 12 HOURS SCHEDULED
Status: DISCONTINUED | OUTPATIENT
Start: 2024-04-18 | End: 2024-04-18 | Stop reason: HOSPADM

## 2024-04-18 RX ORDER — FENTANYL CITRATE 50 UG/ML
INJECTION, SOLUTION INTRAMUSCULAR; INTRAVENOUS PRN
Status: DISCONTINUED | OUTPATIENT
Start: 2024-04-18 | End: 2024-04-18 | Stop reason: SDUPTHER

## 2024-04-18 RX ORDER — BUPIVACAINE HYDROCHLORIDE 5 MG/ML
INJECTION, SOLUTION EPIDURAL; INTRACAUDAL
Status: COMPLETED | OUTPATIENT
Start: 2024-04-18 | End: 2024-04-18

## 2024-04-18 RX ORDER — LIDOCAINE HYDROCHLORIDE 20 MG/ML
INJECTION, SOLUTION INFILTRATION; PERINEURAL PRN
Status: DISCONTINUED | OUTPATIENT
Start: 2024-04-18 | End: 2024-04-18 | Stop reason: SDUPTHER

## 2024-04-18 RX ORDER — ACETAMINOPHEN 500 MG
1000 TABLET ORAL ONCE
Status: COMPLETED | OUTPATIENT
Start: 2024-04-18 | End: 2024-04-18

## 2024-04-18 RX ORDER — OXYCODONE HYDROCHLORIDE 5 MG/1
5 TABLET ORAL
Status: DISCONTINUED | OUTPATIENT
Start: 2024-04-18 | End: 2024-04-18 | Stop reason: HOSPADM

## 2024-04-18 RX ORDER — ONDANSETRON 2 MG/ML
INJECTION INTRAMUSCULAR; INTRAVENOUS PRN
Status: DISCONTINUED | OUTPATIENT
Start: 2024-04-18 | End: 2024-04-18 | Stop reason: SDUPTHER

## 2024-04-18 RX ADMIN — PROPOFOL 50 MG: 10 INJECTION, EMULSION INTRAVENOUS at 07:08

## 2024-04-18 RX ADMIN — ONDANSETRON 4 MG: 2 INJECTION INTRAMUSCULAR; INTRAVENOUS at 07:15

## 2024-04-18 RX ADMIN — DEXAMETHASONE SODIUM PHOSPHATE 4 MG: 4 INJECTION, SOLUTION INTRAMUSCULAR; INTRAVENOUS at 07:16

## 2024-04-18 RX ADMIN — FENTANYL CITRATE 50 MCG: 50 INJECTION, SOLUTION INTRAMUSCULAR; INTRAVENOUS at 07:06

## 2024-04-18 RX ADMIN — MIDAZOLAM 2 MG: 1 INJECTION INTRAMUSCULAR; INTRAVENOUS at 07:02

## 2024-04-18 RX ADMIN — SODIUM CHLORIDE 1000 ML: 9 INJECTION, SOLUTION INTRAVENOUS at 06:43

## 2024-04-18 RX ADMIN — CEFAZOLIN 2000 MG: 2 INJECTION, POWDER, FOR SOLUTION INTRAMUSCULAR; INTRAVENOUS at 07:00

## 2024-04-18 RX ADMIN — ACETAMINOPHEN 1000 MG: 500 TABLET ORAL at 06:50

## 2024-04-18 RX ADMIN — LIDOCAINE HYDROCHLORIDE 50 MG: 20 INJECTION, SOLUTION INFILTRATION; PERINEURAL at 07:08

## 2024-04-18 ASSESSMENT — ENCOUNTER SYMPTOMS: SHORTNESS OF BREATH: 0

## 2024-04-18 ASSESSMENT — PAIN - FUNCTIONAL ASSESSMENT: PAIN_FUNCTIONAL_ASSESSMENT: 0-10

## 2024-04-18 ASSESSMENT — PAIN SCALES - GENERAL: PAINLEVEL_OUTOF10: 0

## 2024-04-18 NOTE — ANESTHESIA POSTPROCEDURE EVALUATION
Department of Anesthesiology  Postprocedure Note    Patient: Isidoro Maxwell  MRN: [id number]  YOB: 1960  Date of evaluation: 4/18/2024    Procedure Summary       Date: 04/18/24 Room / Location: [address]    Anesthesia Start: 0703 Anesthesia Stop: 0725    Procedure: LEFT ANKLE SYNDESMOSIS SCREW REMOVAL (Left: Ankle) Diagnosis:       Syndesmotic disruption of left ankle      (Syndesmotic disruption of left ankle [S93.432A])    Surgeons: Mirtha Quintana MD Responsible Provider: Raheem Reyes MD    Anesthesia Type: general ASA Status: 2            Anesthesia Type: No value filed.    Teodoro Phase I: Teodoro Score: 10    Teodoro Phase II: Teodoro Score: 10    Anesthesia Post Evaluation    Patient location during evaluation: PACU  Patient participation: complete - patient participated  Level of consciousness: awake  Airway patency: patent  Nausea & Vomiting: no nausea and no vomiting  Cardiovascular status: hemodynamically stable  Respiratory status: acceptable  Hydration status: stable  Multimodal analgesia pain management approach  Pain management: adequate    No notable events documented.

## 2024-04-18 NOTE — BRIEF OP NOTE
Brief Postoperative Note      Patient: Isidoro Maxwell  YOB: 1960  MRN: [id number]    Date of Procedure: 4/18/2024    Pre-Op Diagnosis Codes:     * Syndesmotic disruption of left ankle [S93.432A]    Post-Op Diagnosis: Same       Procedure(s):  LEFT ANKLE SYNDESMOSIS SCREW REMOVAL    Surgeon(s):  Mirtha Quintana MD    Assistant: CORY Hodge    Anesthesia: General    Estimated Blood Loss (mL): Minimal    Complications: None    Specimens:   * No specimens in log *    Implants:  * No implants in log *      Drains: * No LDAs found *    Findings:  Infection Present At Time Of Surgery (PATOS) (choose all levels that have infection present):  No infection present  Other Findings: Same.    Electronically signed by Mirtha Quintana MD on 4/18/2024 at 4:52 PM

## 2024-04-18 NOTE — ANESTHESIA PRE PROCEDURE
Department of Anesthesiology  Preprocedure Note       Name:  Isidoro Maxwell   Age:  63 y.o.  :  1960                                          MRN:  [id number]         Date:  2024      Surgeon: Surgeon(s):  Mirtha Quintana MD    Procedure: Procedure(s):  LEFT ANKLE SYNDESMOSIS SCREW REMOVAL    Medications prior to admission:   Prior to Admission medications    Medication Sig Start Date End Date Taking? Authorizing Provider   aspirin 81 MG chewable tablet Take 1 tablet by mouth daily 23   Lito Husain MD   cetirizine (ZYRTEC) 10 MG tablet Take 1 tablet by mouth three times a week As needed per pt.    ProviderLito MD   Multiple Vitamin (MULTI VITAMIN DAILY PO) Take by mouth    ProviderLito MD       Current medications:    Current Facility-Administered Medications   Medication Dose Route Frequency Provider Last Rate Last Admin    ceFAZolin (ANCEF) 2,000 mg in sodium chloride 0.9 % 50 mL IVPB (mini-bag)  2,000 mg IntraVENous On Call to OR Mirtha Quintana MD           Allergies:  No Known Allergies    Problem List:    Patient Active Problem List   Diagnosis Code    Mixed hyperlipidemia E78.2    Allergic rhinitis J30.9    Cardiac murmur due to mitral valve disorder I34.0    Severe mitral regurgitation I34.0    Palpitations R00.2    Fatigue R53.83    Other chest pain R07.89    S/P mitral valve repair Z98.890    Strain of left hip adductor muscle S76.012A    Rotator cuff tendinitis, right M75.81    Closed displaced fracture of lateral malleolus of left fibula S82.62XA    Sprain of right ankle S93.401A    Syndesmotic disruption of left ankle S93.432A       Past Medical History:        Diagnosis Date    Cancer (HCC) 2019    Prostate    Hyperlipidemia        Past Surgical History:        Procedure Laterality Date    FIBULA FRACTURE SURGERY Left 2023    LEFT OPEN REDUCTION AND INTERNAL FIXATION DISTAL FIBULA FRACTURE- JAZZMINE performed by Mirtha Quintana MD at [facility] OR

## 2024-04-18 NOTE — PROGRESS NOTES
Pt awake and alert at this time. Pt on RA, and VSS. Pt denies pain and nausea, tolerating PO. Skin warm, palpable pulses and able to wiggle left foot and toes. Pt meets criteria to be discharged from Phase 1.

## 2024-04-18 NOTE — DISCHARGE INSTRUCTIONS
Post op instruction:  1- D/C home  2- Dx Left ankle retained syndesmosis screw.  3- WBAT left ankle.  4- Elevation surgical site, with ice  5- Keep drsg dry and clean, 3 days, then BandAid.  6- F/U in 2 weeks.       Mirtha Quintana MD, 4/18/2024      ORTHOPEDIC/PODIATRY DISCHARGE INSTRUCTIONS    Follow your surgeons instructions.  Make follow-up appointment.  Observe operative area for signs of excessive bleeding such as a slow general ooze that saturates the dressing or bright red bleeding. In either case, apply pressure to the area and elevate if possible and call your surgeon right away.  Observe the affected extremity for circulation or nerve impairment such as a change in color, numbness, tingling, coldness or increased pain. If any of these symptoms are present call your surgeon.  Observe operative site for any signs of infection such as increased pain, redness, fever greater than 101 degrees, swelling, foul odor or drainage. Contact surgeon if any of these symptoms are present.  If you become short of breath call your surgeon or go to the nearest emergency room.  Remove dressing if directed by surgeon. Leave steristips or sutures or staples in place.  You may loosen your ace wrap if it feels too tight, or if you have severe pain, or if it has swelling.  Elevate extremity as directed by surgeon.  You may shower when directed by surgeon.  Use ice pack as directed by surgeon. Do not use heat.  Avoid stress to suture line such as pulling, pushing or tugging.  Use surgical shoe as directed by surgeon.  Take medications as ordered.Take pain medication with food.Do not drive or operate machinery while taking narcotics.  Call your surgeon for any questions or problems.      ANESTHESIA DISCHARGE INSTRUCTIONS    Wear your seatbelt home.  You are under the influence of drugs-do not drink alcohol, drive, operate machinery, make any important decisions or sign any legal documents for 24 hours.  Children should not ride

## 2024-04-18 NOTE — PROGRESS NOTES
Pt arrived from OR to PACU bay 4. Reported received from OR staff. Pt arousable to voice. Surgical incisions dressings in place to left ankle. Pt on RA, NSR, and VSS.

## 2024-04-18 NOTE — H&P
Preoperative H&P Update    The patient's History and Physical in the medical record from 4/11/2024 was reviewed by me today.    Past Medical History:   Diagnosis Date    Cancer (HCC) 11/2019    Prostate    Hyperlipidemia      Past Surgical History:   Procedure Laterality Date    FIBULA FRACTURE SURGERY Left 11/30/2023    LEFT OPEN REDUCTION AND INTERNAL FIXATION DISTAL FIBULA FRACTURE- JAZZMINE performed by Mirtha Quintana MD at [facility] OR    HERNIA REPAIR  '90 and '95    HOME SLEEP STUDY  06/01/2020         MITRAL VALVE REPAIR  01/03/2023    TONSILLECTOMY       No current facility-administered medications on file prior to encounter.     Current Outpatient Medications on File Prior to Encounter   Medication Sig Dispense Refill    aspirin 81 MG chewable tablet Take 1 tablet by mouth daily      cetirizine (ZYRTEC) 10 MG tablet Take 1 tablet by mouth three times a week As needed per pt.      Multiple Vitamin (MULTI VITAMIN DAILY PO) Take by mouth         No Known Allergies   I reviewed the HPI, medications, allergies, reason for surgery, diagnosis and treatment plan and there has been no change.    The patient was evaluated by me today. Physical exam findings for this update include:    Vitals:    04/18/24 0628   BP: 122/78   Pulse: 64   Resp: 16   Temp: 97.6 °F (36.4 °C)   SpO2: 100%     Airway is intact  Chest: chest clear, no wheezing, rales, normal symmetric air entry, no tachypnea, retractions or cyanosis  Heart: regular rate and rhythm ; heart sounds normal  Findings on exam of the body region where surgery is to be performed include:  Left ankle retained syndesmosis screw.    Electronically signed by Mirtha Quintana MD on 4/18/2024 at 6:39 AM

## 2024-04-18 NOTE — PROGRESS NOTES
Discharge instructions review with patient and wife Lillie. Pt discharged via wheelchair. Pt discharged with all belongings. Lillie taking stable pt home.

## 2024-04-19 NOTE — OP NOTE
and we were able to remove the syndesmosis screw, it was found to be intact.  At this point, we let the tourniquet down.  Hemostasis was secured.  We irrigated the incision copiously with normal saline.  We closed the skin with a 4-0 Monocryl.  Steri-Strips was then applied.  Dressing was then applied in the from Xeroform, 4x4s, sterile Webril, and Ace wrap.    The patient tolerated the procedure well, was taken to the recovery in stable condition.    Hanane Hodge CNP was 1st Assist given the nature of the procedure that needed advanced assistance. She assisted in all aspect of the procedure, including but limited to draping in a sterile fashion, exposure of surgical area, controlling bleeding, retracting and protecting important structures, achieve and maintain reduction and surgical wound closure with dressing application.    POSTOPERATIVE PLAN:  The patient will be discharged home.  He is allowed to be weightbearing as tolerated with no heavy impact activities for 4-6 weeks.          RODNEY CISNEROS MD      D:  04/18/2024 16:56:32     T:  04/19/2024 00:10:06     /SUKHWINDER  Job #:  [id number]     Doc#:  [id number]    CC:   Brandon Bustillo MD

## 2024-04-24 ENCOUNTER — HOSPITAL ENCOUNTER (OUTPATIENT)
Age: 64
Discharge: HOME | End: 2024-04-24
Payer: COMMERCIAL

## 2024-04-24 PROCEDURE — 84154 ASSAY OF PSA FREE: CPT

## 2024-04-25 NOTE — ANESTHESIA POSTPROCEDURE EVALUATION
Department of Anesthesiology  Postprocedure Note    Patient: Sofy Preston  MRN: [id number]  YOB: 1960  Date of evaluation: 11/30/2023      Procedure Summary       Date: 11/30/23 Room / Location: [address]    Anesthesia Start: 1395 Anesthesia Stop: 0829    Procedure: LEFT OPEN REDUCTION AND INTERNAL FIXATION DISTAL FIBULA FRACTURE- [place] (Left: Leg Lower) Diagnosis:       Closed fracture of left ankle, initial encounter      (Closed fracture of left ankle, initial encounter [W42.916J])    Surgeons: Latrell Negron MD Responsible Provider: Angi Garcia MD    Anesthesia Type: general ASA Status: 3            Anesthesia Type: No value filed.     Teodoro Phase I: Teodoro Score: 10    Teodoro Phase II:        Anesthesia Post Evaluation    Patient location during evaluation: PACU  Patient participation: complete - patient participated  Level of consciousness: awake and alert  Airway patency: patent  Nausea & Vomiting: no nausea and no vomiting  Complications: no  Cardiovascular status: hemodynamically stable  Respiratory status: acceptable  Hydration status: stable  Multimodal analgesia pain management approach  Pain management: adequate Pt scheduled here on 6/20

## 2024-04-26 LAB
PROSTATIC SPECIFIC AG: 8.8 NG/ML (ref 0–4)
PSA FREE MFR SERPL: 15.9 %
PSA FREE SERPL-MCNC: 1.4 UG/L

## 2024-05-02 ENCOUNTER — OFFICE VISIT (OUTPATIENT)
Dept: ORTHOPEDIC SURGERY | Age: 64
End: 2024-05-02

## 2024-05-02 VITALS — HEIGHT: 70 IN | BODY MASS INDEX: 25.06 KG/M2 | WEIGHT: 175.04 LBS

## 2024-05-02 DIAGNOSIS — S93.432A SYNDESMOTIC DISRUPTION OF LEFT ANKLE, INITIAL ENCOUNTER: Primary | ICD-10-CM

## 2024-05-02 PROCEDURE — APPNB15 APP NON BILLABLE TIME 0-15 MINS: Performed by: NURSE PRACTITIONER

## 2024-05-02 PROCEDURE — 99024 POSTOP FOLLOW-UP VISIT: CPT | Performed by: NURSE PRACTITIONER

## 2024-05-03 NOTE — PROGRESS NOTES
DIAGNOSIS:  Left ankle hardware removal, syndesmosis screw.    DATE OF SURGERY: 4/18/2024.    HISTORY OF PRESENT ILLNESS:  Mr. Maxwell 63 y.o.  male who came in today for 2 weeks postoperative visit.  The patient denies any significant pain in the left ankle. He has been WBAT.  He is doing very well and is very happy with the surgery.  No numbness or tingling sensation. No fever or Chills.     PHYSICAL EXAMINATION:  The incision healing well .  No signs of any erythema or drainage, no swelling. He has no pain with the active or passive range of motion of the left ankle, good ROM.  He has intact sensation distally, and he is neurovascularly intact.    IMAGING:  Three views left ankle taken today in the office showed hardware syndesmosis screw removal, no fracture. Remaining hardware is intact showing no signs of failure.    IMPRESSION:  2 weeks out from left ankle syndesmosis screw removal and doing very well.    PLAN: He can go back to normal activity with no heavy impact activities for 6 weeks.  The patient will come back for a follow up in 3 months.  At that time, we will take 3 views of the left ankle.    Hanane Hodge, APRN - CNP

## 2024-10-25 ENCOUNTER — OFFICE VISIT (OUTPATIENT)
Dept: CARDIOLOGY CLINIC | Age: 64
End: 2024-10-25
Payer: COMMERCIAL

## 2024-10-25 VITALS
HEART RATE: 69 BPM | DIASTOLIC BLOOD PRESSURE: 62 MMHG | SYSTOLIC BLOOD PRESSURE: 118 MMHG | WEIGHT: 178 LBS | OXYGEN SATURATION: 99 % | BODY MASS INDEX: 25.48 KG/M2 | HEIGHT: 70 IN

## 2024-10-25 DIAGNOSIS — E78.2 MIXED HYPERLIPIDEMIA: ICD-10-CM

## 2024-10-25 DIAGNOSIS — R79.9 ABNORMAL BLOOD CHEMISTRY LEVEL: ICD-10-CM

## 2024-10-25 DIAGNOSIS — I34.0 SEVERE MITRAL REGURGITATION: Primary | ICD-10-CM

## 2024-10-25 DIAGNOSIS — Z98.890 S/P MITRAL VALVE REPAIR: ICD-10-CM

## 2024-10-25 DIAGNOSIS — R53.83 OTHER FATIGUE: ICD-10-CM

## 2024-10-25 PROCEDURE — 99214 OFFICE O/P EST MOD 30 MIN: CPT | Performed by: INTERNAL MEDICINE

## 2024-10-25 PROCEDURE — 93000 ELECTROCARDIOGRAM COMPLETE: CPT | Performed by: INTERNAL MEDICINE

## 2024-10-25 PROCEDURE — 1123F ACP DISCUSS/DSCN MKR DOCD: CPT | Performed by: INTERNAL MEDICINE

## 2024-10-25 NOTE — PROGRESS NOTES
moderate   mitral regurgitation.   The left atrium is mildly dilated.   Mild tricuspid regurgitation with a PASP of 20 mmHg.    JOHNNA 10/3/22 (Nguyen)   Summary   Left ventricular cavity size is normal with normal left ventricular wall   thickness.   Normal LV systolic function estimated LVEF 55-60%   Anterior mitral leaflet is elongated with mild prolapse of midsection and a   somewhat hockey-stick motion.   Posterior leaflet appears shortened.   There is lack of coaptation of A2/P2 resulting in severe eccentric mitral   regurgitation.   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    Post op TTE 1/6/2023 ([facility])  Study Conclusions  - Left ventricle: There is mild concentric hypertrophy. Systolic  function was mildly reduced. The calculated ejection fraction was  in the range of 41% to 46%. Mild diffuse hypokinesis. The stroke  volume is 77ml. The stroke index is 39ml/m^2.  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 The  valve area (LVOT continuity) is 1.7cm^2.  - Right ventricle: The cavity size is normal. Wall thickness is  normal. Systolic function is normal.  - Right atrium: The atrium is normal in size.  - Inferior vena cava: The vessel was normal in size; the  respirophasic diameter changes were in the normal range (&gt;= 50%);  findings are consistent with normal central venous pressure.    TTE 5/1/23   Summary   Left ventricular cavity size is normal. Overall left ventricular systolic   function appears normal. Ejection fraction is visually estimated to be   55-60%. E/e'= 12.5   No regional wall motion abnormalities are noted. Normal diastolic function.   A mitral valve repair is noted with a peak velocity of 121 m/s and a mean   gradient of 3 mmHg.   Mild mitral regurgitation. The mitral leaflets appear myxomatous.   The aortic valve is structurally normal.   No evidence of aortic valve regurgitation.   The aortic valve appears tricuspid .   The right ventricle is normal in size

## 2024-10-25 NOTE — PATIENT INSTRUCTIONS
Continue taking antibiotics prior to dental cleaning and procedures.    -Make sure you are staying hydrated with water as your primary source of fluid. At minimum you should drink 64 ounces of water daily and this is without considering caffeine intake. Drink additional water to \"make up\" for caffeine intake, as it is a diuretic.  -Make sure you change position slowly (sitting to standing).  -Consider compression socks during the day to \"push\" blood back up to the heart and head (over the counter is at 15-20 mmHg strength).    Fasting bloodwork (8 hours).

## 2024-11-11 DIAGNOSIS — R53.83 OTHER FATIGUE: ICD-10-CM

## 2024-11-11 DIAGNOSIS — R79.9 ABNORMAL BLOOD CHEMISTRY LEVEL: ICD-10-CM

## 2024-11-11 DIAGNOSIS — I34.0 SEVERE MITRAL REGURGITATION: ICD-10-CM

## 2024-11-11 LAB
25(OH)D3 SERPL-MCNC: 33.6 NG/ML
ALBUMIN SERPL-MCNC: 4.2 G/DL (ref 3.4–5)
ALBUMIN/GLOB SERPL: 2.3 {RATIO} (ref 1.1–2.2)
ALP SERPL-CCNC: 92 U/L (ref 40–129)
ALT SERPL-CCNC: 19 U/L (ref 10–40)
ANION GAP SERPL CALCULATED.3IONS-SCNC: 6 MMOL/L (ref 3–16)
AST SERPL-CCNC: 21 U/L (ref 15–37)
BASOPHILS # BLD: 0 K/UL (ref 0–0.2)
BASOPHILS NFR BLD: 0.7 %
BILIRUB SERPL-MCNC: 0.4 MG/DL (ref 0–1)
BUN SERPL-MCNC: 15 MG/DL (ref 7–20)
CALCIUM SERPL-MCNC: 9.3 MG/DL (ref 8.3–10.6)
CHLORIDE SERPL-SCNC: 101 MMOL/L (ref 99–110)
CHOLEST SERPL-MCNC: 250 MG/DL (ref 0–199)
CO2 SERPL-SCNC: 27 MMOL/L (ref 21–32)
CREAT SERPL-MCNC: 1 MG/DL (ref 0.8–1.3)
DEPRECATED RDW RBC AUTO: 13.4 % (ref 12.4–15.4)
EOSINOPHIL # BLD: 0.4 K/UL (ref 0–0.6)
EOSINOPHIL NFR BLD: 10.8 %
FERRITIN SERPL IA-MCNC: 340 NG/ML (ref 30–400)
FOLATE SERPL-MCNC: 13.5 NG/ML (ref 4.78–24.2)
GFR SERPLBLD CREATININE-BSD FMLA CKD-EPI: 83 ML/MIN/{1.73_M2}
GLUCOSE SERPL-MCNC: 82 MG/DL (ref 70–99)
HCT VFR BLD AUTO: 44.9 % (ref 40.5–52.5)
HDLC SERPL-MCNC: 69 MG/DL (ref 40–60)
HGB BLD-MCNC: 15.3 G/DL (ref 13.5–17.5)
IRON SATN MFR SERPL: 34 % (ref 20–50)
IRON SERPL-MCNC: 100 UG/DL (ref 59–158)
LDLC SERPL CALC-MCNC: 168 MG/DL
LYMPHOCYTES # BLD: 1.9 K/UL (ref 1–5.1)
LYMPHOCYTES NFR BLD: 46.9 %
MCH RBC QN AUTO: 31.1 PG (ref 26–34)
MCHC RBC AUTO-ENTMCNC: 34.1 G/DL (ref 31–36)
MCV RBC AUTO: 91.4 FL (ref 80–100)
MONOCYTES # BLD: 0.3 K/UL (ref 0–1.3)
MONOCYTES NFR BLD: 7.6 %
NEUTROPHILS # BLD: 1.4 K/UL (ref 1.7–7.7)
NEUTROPHILS NFR BLD: 34 %
PLATELET # BLD AUTO: 180 K/UL (ref 135–450)
PMV BLD AUTO: 9.4 FL (ref 5–10.5)
POTASSIUM SERPL-SCNC: 4.6 MMOL/L (ref 3.5–5.1)
PROT SERPL-MCNC: 6 G/DL (ref 6.4–8.2)
RBC # BLD AUTO: 4.92 M/UL (ref 4.2–5.9)
SODIUM SERPL-SCNC: 134 MMOL/L (ref 136–145)
T4 FREE SERPL-MCNC: 1 NG/DL (ref 0.9–1.8)
TIBC SERPL-MCNC: 291 UG/DL (ref 260–445)
TRIGL SERPL-MCNC: 67 MG/DL (ref 0–150)
TSH SERPL DL<=0.005 MIU/L-ACNC: 2.65 UIU/ML (ref 0.27–4.2)
VIT B12 SERPL-MCNC: 992 PG/ML (ref 211–911)
VLDLC SERPL CALC-MCNC: 13 MG/DL
WBC # BLD AUTO: 4 K/UL (ref 4–11)

## 2024-11-12 LAB
EST. AVERAGE GLUCOSE BLD GHB EST-MCNC: 93.9 MG/DL
HBA1C MFR BLD: 4.9 %

## 2024-11-13 LAB
SHBG SERPL-SCNC: 56 NMOL/L (ref 19–76)
TESTOST FREE SERPL-MCNC: 91.1 PG/ML (ref 47–244)
TESTOST SERPL-MCNC: 601 NG/DL (ref 193–740)

## 2025-05-13 ENCOUNTER — HOSPITAL ENCOUNTER (OUTPATIENT)
Age: 65
Discharge: HOME | End: 2025-05-13
Payer: COMMERCIAL

## 2025-05-13 LAB
BUN SERPL-MCNC: 15 MG/DL (ref 7–20)
CREAT SERPL-MCNC: 1.1 MG/DL (ref 0.8–1.3)
GFR SERPLBLD CREATININE-BSD FMLA CKD-EPI: 74 ML/MIN/{1.73_M2}
PSA SERPL DL<=0.01 NG/ML-MCNC: 8.9 NG/ML (ref 0–4)

## 2025-05-13 PROCEDURE — 84520 ASSAY OF UREA NITROGEN: CPT

## 2025-05-13 PROCEDURE — 84153 ASSAY OF PSA TOTAL: CPT

## 2025-05-13 PROCEDURE — 82565 ASSAY OF CREATININE: CPT

## 2025-05-13 PROCEDURE — 36415 COLL VENOUS BLD VENIPUNCTURE: CPT

## (undated) DEVICE — SUTURE MCRYL + SZ 4-0 L27IN ABSRB UD L19MM PS-2 3/8 CIR MCP426H

## (undated) DEVICE — BANDAGE COMPR W6INXL10YD ST M E WHITE/BEIGE

## (undated) DEVICE — C-ARM: Brand: UNBRANDED

## (undated) DEVICE — PACK PROCEDURE SURG EXTREMITY MFFOP CUST

## (undated) DEVICE — DRESSING,GAUZE,XEROFORM,CURAD,1"X8",ST: Brand: CURAD

## (undated) DEVICE — GLOVE SURG SZ 65 THK91MIL LTX FREE SYN POLYISOPRENE

## (undated) DEVICE — MEDICINE CUP, GRADUATED, STER: Brand: MEDLINE

## (undated) DEVICE — SUTURE VCRL + SZ 2-0 L18IN ABSRB UD CT1 L36MM 1/2 CIR VCP839D

## (undated) DEVICE — BIT DRL L100MM DIA2.5MM FOR SM FRAG UNIV LOK SYS

## (undated) DEVICE — T-DRAPE,EXTREMITY,STERILE: Brand: MEDLINE

## (undated) DEVICE — BIT DRL DIA2MM STD QUIK CONN FOR PERIARTC LOK PLT SYS

## (undated) DEVICE — GLOVE ORANGE PI 8   MSG9080

## (undated) DEVICE — GAUZE,SPONGE,4"X4",8PLY,STRL,LF,10/TRAY: Brand: MEDLINE

## (undated) DEVICE — SPONGE LAP W18XL18IN WHT COT 4 PLY FLD STRUNG RADPQ DISP ST 2 PER PACK

## (undated) DEVICE — BANDAGE COMPR W4INXL12FT E DISP ESMARCH EVEN

## (undated) DEVICE — SYRINGE IRRIG 60ML SFT PLIABLE BLB EZ TO GRP 1 HND USE W/

## (undated) DEVICE — PADDING CAST W4INXL4YD ST COT RAYON MICROPLEATED HIGHLY

## (undated) DEVICE — DRAPE,U/ SHT,SPLIT,PLAS,STERIL: Brand: MEDLINE

## (undated) DEVICE — TOWEL,OR,DSP,ST,BLUE,STD,4/PK,20PK/CS: Brand: MEDLINE

## (undated) DEVICE — GLOVE ORTHO 8   MSG9480

## (undated) DEVICE — SUTURE VCRL + SZ 3-0 L18IN ABSRB UD SH 1/2 CIR TAPERCUT NDL VCP864D

## (undated) DEVICE — BANDAGE COMPR W4INXL5YD BGE HI E W/ REM CLP SURE-WRAP

## (undated) DEVICE — SYRINGE 60ML BULB IRRIG ST LF

## (undated) DEVICE — BANDAGE,ELASTIC,ESMARK,STERILE,6"X9',LF: Brand: MEDLINE

## (undated) DEVICE — PADDING CAST N ADH 12X6 IN CRIMPED FINISH 100% COTTON WBRLII

## (undated) DEVICE — GLOVE SURG SZ 65 L12IN THK75MIL DK GRN LTX FREE

## (undated) DEVICE — ELECTRODE PT RET AD L9FT HI MOIST COND ADH HYDRGEL CORDED

## (undated) DEVICE — HYPODERMIC SAFETY NEEDLE: Brand: MAGELLAN

## (undated) DEVICE — PADDING CAST W6INXL4YD ST COT RAYON MICROPLEATED HIGHLY

## (undated) DEVICE — SOLUTION IRRIG 500ML 0.9% SOD CHLO USP POUR PLAS BTL

## (undated) DEVICE — GLOVE,SURG,SENSICARE SLT,LF,PF,8: Brand: MEDLINE

## (undated) DEVICE — SUTURE MONOCRYL + SZ 4-0 L27IN ABSRB UD L19MM PS-2 3/8 CIR MCP426H

## (undated) DEVICE — APPLICATOR MEDICATED 26 CC SOLUTION HI LT ORNG CHLORAPREP

## (undated) DEVICE — INTENDED FOR TISSUE SEPARATION, AND OTHER PROCEDURES THAT REQUIRE A SHARP SURGICAL BLADE TO PUNCTURE OR CUT.: Brand: BARD-PARKER ® STAINLESS STEEL BLADES

## (undated) DEVICE — SHEET,DRAPE,53X77,STERILE: Brand: MEDLINE

## (undated) DEVICE — MASTISOL ADHESIVE LIQ 2/3ML

## (undated) DEVICE — MASC TURNOVER KIT: Brand: MEDLINE INDUSTRIES, INC.

## (undated) DEVICE — GLOVE SURG SZ 65 L12IN FNGR THK79MIL GRN LTX FREE

## (undated) DEVICE — 3M™ STERI-STRIP™ REINFORCED ADHESIVE SKIN CLOSURES, R1547, 1/2 IN X 4 IN (12 MM X 100 MM), 6 STRIPS/ENVELOPE: Brand: 3M™ STERI-STRIP™